# Patient Record
Sex: MALE
[De-identification: names, ages, dates, MRNs, and addresses within clinical notes are randomized per-mention and may not be internally consistent; named-entity substitution may affect disease eponyms.]

---

## 2019-06-05 ENCOUNTER — HOSPITAL ENCOUNTER (EMERGENCY)
Dept: HOSPITAL 89 - ER | Age: 39
Discharge: HOME | End: 2019-06-05
Payer: COMMERCIAL

## 2019-06-05 VITALS — SYSTOLIC BLOOD PRESSURE: 134 MMHG | DIASTOLIC BLOOD PRESSURE: 87 MMHG

## 2019-06-05 DIAGNOSIS — M54.31: Primary | ICD-10-CM

## 2019-06-05 PROCEDURE — 99284 EMERGENCY DEPT VISIT MOD MDM: CPT

## 2019-06-05 PROCEDURE — 72120 X-RAY BEND ONLY L-S SPINE: CPT

## 2019-06-05 PROCEDURE — 72202 X-RAY EXAM SI JOINTS 3/> VWS: CPT

## 2019-06-05 NOTE — RADIOLOGY IMAGING REPORT
FACILITY: [facility] [place] 

 

PATIENT NAME: Saturnino Dill

: 1980

MR: [id number]

V: [id number]

EXAM DATE: 

ORDERING PHYSICIAN: MATTIE BISWAS

TECHNOLOGIST: 

 

Location: [facility]

Patient: Saturnino Dill

: 1980

MRN: [id number]

Visit/Account:[id number]

Date of Sevice:  2019

 

ACCESSION #: [id number]

 

Exam type: SACROILIAC JOINT 3 OR > VIEWS

 

History: 2018, splinting numbness and tingling occasionally

 

Comparison: None.

 

Findings:

 

The SI joints appear symmetric bilaterally.  No significant arthritic change identified within the SI
 joints.  There is no gross evidence of a sacral fracture on provided images.

 

IMPRESSION:

 

1.  Unremarkable SI joints

 

Report Dictated By: Lauren Rosaels MD at 2019 4:22 PM

 

Report E-Signed By: Lauren Rosales MD  at 2019 4:23 PM

 

WSN:[id number]

## 2019-06-05 NOTE — ER REPORT
History and Physical


Time Seen By MD:  15:26


Hx. of Stated Complaint:  


Pt. slipped on ice back in November, no acute injury. Now has right buttock pain





that radiates down his leg into his calf. Pain 8/10. Pt. seen at urgent care on 


Saturday, given Flexeril and Prednisone dose pack and isn't getting any relief.


HPI/ROS


CHIEF COMPLAINT: Right leg pain





HISTORY OF PRESENT ILLNESS: 38 year old male presents to ED with reports of 


right leg pain. Back in November patient slipped on an ice patch and fell on his


back. At that time he had back pain that lasted for several weeks, however it 


eventually went away. 2 weeks ago the same type of back pain returned. Reports 


dull pain in right lower back that sent shooting, sharp, stabbing, and tingling 


sensations to his right hamstring and right calf. Denies numbness in his feet, 


denies saddle anesthesia. Patient reports he has taken 1/2 a tab of old 


hydrocodone a day since the pain started, which helped with the pain, but he 


took his last one this weekend. He went to Urgent care on Saturday where they 


prescribed him a medrol dose pack and cyclobenzaprine. However, his pain has 


worsened since that time. All movement including standing aggravates the pain; 


laying in a side lying position helps the pain. 





REVIEW OF SYSTEMS:


Constitutional: Denies fevers, appetite changes


Respiratory: No cough, no dyspnea.


Cardiovascular: No chest pain, no palpitations.


Gastrointestinal: No vomiting, no abdominal pain.


Musculoskeletal: Back pain as noted above.


Allergies:  


Coded Allergies:  


     No Known Drug Allergies (Unverified , 6/5/19)


Home Meds


Active Scripts


Hydrocodone Bit/Acetaminophen (HYDROCODON-ACETAMINOPHEN 5-325) 1 Each Tablet, 1 


EACH PO Q4-6H PRN for PAIN, #6 TAB


   Prov:MATTIE BISWAS FNCHINYERE         6/5/19


Past Medical/Surgical History


Past medical hx of MICH.


No significant past surgical hx.


Reviewed Nurses Notes:  Yes


Constitutional





Vital Sign - Last 24 Hours








 6/5/19 6/5/19 6/5/19 6/5/19





 15:16 15:18 15:23 15:28


 


Temp 97.8   


 


Pulse 82  91 93


 


Resp 20   


 


B/P (MAP) 151/97 151/97 (115)  


 


Pulse Ox 88  91 93


 


O2 Delivery Room Air   


 


    





 6/5/19 6/5/19 6/5/19 6/5/19





 15:30 15:33 15:38 15:43


 


Pulse  86 81 85


 


B/P (MAP) 127/95 (106)   


 


Pulse Ox  94 90 90





 6/5/19 6/5/19 6/5/19 6/5/19





 15:48 16:14 16:18 16:23


 


Pulse 77  79 82


 


B/P (MAP)  138/95 (109)  


 


Pulse Ox 90  93 91





 6/5/19 6/5/19 6/5/19 





 16:28 16:30 16:33 


 


Pulse 88  84 


 


B/P (MAP)  134/87 (103)  


 


Pulse Ox 89  90 








Physical Exam


General Appearance: The patient is alert, has no immediate need for airway 


protection and no current signs of toxicity.  


Eyes: Pupils equal and round no injection.


Respiratory: Chest is non tender, lungs are clear to auscultation.


Cardiac: regular rate and rhythm 


Gastrointestinal: Abdomen is soft and non tender, no masses, bowel sounds 


normal.


Musculoskeletal:  Neck: Neck is supple and non tender. Able to perform straight 


leg raise test, however, when lifting right leg, reports shooting pains. On 


palpation of right lower back, has mild pain. Sharp pain with palpation of mid 


hamstring. No pain with palpation of calf.


Skin: No rashes or lesions.





DIFFERENTIAL DIAGNOSIS: After history and physical exam differential diagnosis 


was considered for sciatica, disc disease, muscle strain, muscle spasm.





Medical Decision Making


EKG/Imaging


Imaging


Lumbar spine x-ray:


Findings:


 


AP lateral and both oblique views of the lumbar spine were submitted.


 


There are five nonrib-bearing lumbar-type bodies present.  Small marginal 


osteophyte are noted at L3, L4-L5.  There is mild disc space narrowing at L4-5 


and L5-S1.  Period there is a small calcification projecting just posterior to 


the L2-3 level.  There is a minimal levoconvex scoliosis


 


IMPRESSION:


 


1.  Mild spondylotic changes of lumbar spine as described.  If patient's pain co


ntinues MR is recommended





SI joint x-ray:


Findings:


 


The SI joints appear symmetric bilaterally.  No significant arthritic change 


identified within the SI joints.  There is no gross evidence of a sacral 


fracture on provided images.


 


IMPRESSION:


 


1.  Unremarkable SI joints





ED Course/Re-evaluation


ED Course


Upon arrival to the ED, patient admitted to an exam room, hx and physical 


obtained, differentials considered. Patient presents with right low back pain 


that radiates down his right leg. Back in November patient slipped on an ice 


patch and fell on his back. At that time he had back pain that lasted for 


several weeks, however it eventually went away. 2 weeks ago the same type of 


back pain returned. Reports dull pain in right lower back that sends shooting, 


sharp, stabbing, and tingling sensations to his right hamstring and right calf. 


Denies numbness in his feet, denies saddle anesthesia. He went to Urgent care on


Saturday where they prescribed him a medrol dose pack and cyclobenzaprine. 


However, his pain has worsened since that time. Able to perform straight leg 


raise test, however, when lifting right leg, reports shooting pains. On 


palpation of right lower back, has mild pain. Sharp pain with palpation of mid 


hamstring. No pain with palpation of calf. Lumbar spine and SI joint x-rays 


ordered. Lumbar x-ray showed There is mild disc space narrowing at L4-5 and L5-


S1. Unremarkable SI joints. Since no acute concern at this time, will refer 


patient to Dr. Pace for further evaluation. In the meantime will prescribe 


Lrotab to help with the pain and have him continue his medrol dose pack and c


yclobenzaprine he received from Urgent care. Patient agrees with plan of care.


Decision to Disposition Date:  Jun 5, 2019


Decision to Disposition Time:  16:40





Depart


Departure


Latest Vital Signs





Vital Signs








  Date Time  Temp Pulse Resp B/P (MAP) Pulse Ox O2 Delivery O2 Flow Rate FiO2


 


6/5/19 16:33  84   90   


 


6/5/19 16:30    134/87 (103)    


 


6/5/19 15:16 97.8  20   Room Air  








Impression:  


   Primary Impression:  


   Sciatic leg pain


Condition:  Condition Unchanged


Disposition:  HOME OR SELF-CARE


Referrals:  


MELE PACE MD


New Scripts


Hydrocodone Bit/Acetaminophen (HYDROCODON-ACETAMINOPHEN 5-325) 1 Each Tablet


1 EACH PO Q4-6H PRN for PAIN, #6 TAB


   Prov: MATTIE BISWAS FNCHINYERE         6/5/19


Patient Instructions:  Sciatica (ED)





Additional Instructions:  


Please drink plenty of water and get plenty of rest.


You may take 1 tab of lortab every 4-6 hours as needed.


Please follow-up with Dr. Pace as soon as possible for continued pain.


Continue to take the medications prescribed to you by Urgent care.


Please return to the ED with increased pain, numbness in your extremities, or 


any other concerns.











MATTIE BISWAS                 Jun 5, 2019 15:26

## 2019-06-05 NOTE — RADIOLOGY IMAGING REPORT
FACILITY: [facility] - [place] 

 

PATIENT NAME: Saturnino Dill

: 1980

MR: [id number]

V: [id number]

EXAM DATE: 

ORDERING PHYSICIAN: MATTIE BISWAS

TECHNOLOGIST: 

 

Location: [facility]

Patient: Saturnino Dill

: 1980

MRN: [id number]

Visit/Account:[id number]

Date of Sevice:  2019

 

ACCESSION #: [id number]

 

Exam type: L-SPINE >4 VIEWS

 

History: Fell in 2018, experiencing numbness and tingling occasionally

 

Comparison: None.

 

Findings:

 

AP lateral and both oblique views of the lumbar spine were submitted.

 

There are five nonrib-bearing lumbar-type bodies present.  Small marginal osteophyte are noted at L3,
 L4-L5.  There is mild disc space narrowing at L4-5 and L5-S1.  Period there is a small calcification
 projecting just posterior to the L2-3 level.  There is a minimal levoconvex scoliosis

 

IMPRESSION:

 

1.  Mild spondylotic changes of lumbar spine as described.  If patient's pain continues MR is recomme
nded

 

Report Dictated By: Lauren Rosales MD at 2019 4:23 PM

 

Report E-Signed By: Lauren Rosales MD  at 2019 4:25 PM

 

WSN:[id number]

## 2019-07-15 ENCOUNTER — HOSPITAL ENCOUNTER (EMERGENCY)
Dept: HOSPITAL 89 - ER | Age: 39
Discharge: HOME | End: 2019-07-15
Payer: COMMERCIAL

## 2019-07-15 VITALS — DIASTOLIC BLOOD PRESSURE: 97 MMHG | SYSTOLIC BLOOD PRESSURE: 132 MMHG

## 2019-07-15 DIAGNOSIS — L02.214: Primary | ICD-10-CM

## 2019-07-15 PROCEDURE — 99284 EMERGENCY DEPT VISIT MOD MDM: CPT

## 2019-07-15 PROCEDURE — 76705 ECHO EXAM OF ABDOMEN: CPT

## 2019-07-15 NOTE — RADIOLOGY IMAGING REPORT
FACILITY: [facility] 

 

PATIENT NAME: Saturnino Dill

: 1980

MR: [id number]

V: [id number]

EXAM DATE: 

ORDERING PHYSICIAN: DARIANA SPARKS

TECHNOLOGIST: 

 

Location: [facility] [place]

Patient: Saturnino Dill

: 1980

MRN: [id number]

Visit/Account:[id number]

Date of Sevice:  7/15/2019

 

ACCESSION #: [id number]

 

EXAMINATION:  Right groin ultrasound

 

HISTORY:  Palpable lump at the right groin.

 

COMPARISON:  None.

 

FINDINGS:

Ultrasound evaluation was performed along the soft tissues of the right groin in an area of palpable 
concern.

 

Imaging in this region demonstrates a complex hypoechoic fluid collection measuring 2.6 x 2.6 x 1.0 c
m, with some peripheral hyperemia and adjacent soft tissue edema.

 

IMPRESSION:

Complex 2.6 cm fluid collection in the superficial soft tissues at the right groin. Ultrasound appear
ance is somewhat nonspecific but in the proper clinical setting this could be compatible with a local
ized soft tissue abscess.

 

Report Dictated By: Tonio Oscar MD at 7/15/2019 3:03 PM

 

Report E-Signed By: Tonio Oscar MD  at 7/15/2019 3:06 PM

 

WSN:[id number]

## 2019-07-15 NOTE — ER REPORT
History and Physical


Time Seen By MD:  13:50


Hx. of Stated Complaint:  


KNOT IN GROIN CAUSING PAIN AND NUMBNESS


HPI/ROS


CHIEF COMPLAINT: Groin pain





HISTORY OF PRESENT ILLNESS: 44-year-old male comes in with induration and 


swelling around his right scrotal area and right inguinal area patient 


Cystografin last couple of days with some tenderness she's had some numbness and


is able does have history of sciatica he says this feels different patient 


denies any trauma to the areas had multiple abscesses in the past





REVIEW OF SYSTEMS:


Respiratory: No cough, no dyspnea.


Cardiovascular: No chest pain, no palpitations.


Gastrointestinal: No vomiting, no abdominal pain.


Musculoskeletal: No back pain.


Remainder of the 14 system rev:  Yes


Allergies:  


Coded Allergies:  


     No Known Drug Allergies (Unverified , 7/15/19)


Home Meds


Discontinued Scripts


Hydrocodone Bit/Acetaminophen (HYDROCODON-ACETAMINOPHEN 5-325) 1 Each Tablet, 1 


EACH PO Q4-6H PRN for PAIN, #6 TAB


   Prov:MATTIE BISWAS FNP         6/5/19


Reviewed Nurses Notes:  Yes


Old Medical Records Reviewed:  Yes


Constitutional





Vital Sign - Last 24 Hours








 7/15/19





 14:22


 


Temp 99.5


 


Pulse 117


 


Resp 16


 


B/P (MAP) 132/97


 


Pulse Ox 89


 


O2 Delivery Room Air








Physical Exam


   General appearance: Alert no distress.


Respiratory: Chest is non tender, lungs are clear to auscultation.


Cardiac: Regular rate and rhythm [ ]


Scrotal inguinal exam patient does have a 6 x 8 cm area and around the inguinal 


and scrotal region consistent with a probable abscess nonfluctuant somewhat 


mobile clearly indurated


DIFFERENTIAL DIAGNOSIS: After history and physical exam differential diagnosis 


was considered for abscess versus mass





Medical Decision Making


ED Course/Re-evaluation


ED Course


ED course 38-year-old male comes in with an indurated abscess in the right 


inguinal area unable to be drained at this time with procedure talked to general


surgery after the ultrasound he had reviewed decided was put on antibiotics in 


the next 2 days and have him follow-up in his clinic I will he'll hopefully have


enough up to drain at that point


Decision to Disposition Date:  Jul 15, 2019


Decision to Disposition Time:  15:00





Depart


Departure


Latest Vital Signs





Vital Signs








  Date Time  Temp Pulse Resp B/P (MAP) Pulse Ox O2 Delivery O2 Flow Rate FiO2


 


7/15/19 14:22 99.5 117 16 132/97 89 Room Air  








Impression:  


   Primary Impression:  


   Abscess


Condition:  Condition Unchanged


Disposition:  HOME OR SELF-CARE


Referrals:  


ULLRICH,JOHN A MD


2 Days


New Scripts


Amoxicillin/Pot Clav 875-125 Mg Tab (AUGMENTIN 875-125 TABLET) 1 Each Tablet


1 TAB PO Q12H for 7 Days, #14 TAB


   Prov: DARIANA SPARKS MD         7/15/19


Patient Instructions:  Abscess (ED)





Additional Instructions:  


Apply warm compresses frequently











DARIANA SPARKS MD           Jul 15, 2019 14:56

## 2019-07-17 ENCOUNTER — HOSPITAL ENCOUNTER (OUTPATIENT)
Dept: HOSPITAL 89 - LAB | Age: 39
End: 2019-07-17
Attending: SURGERY
Payer: COMMERCIAL

## 2019-07-17 DIAGNOSIS — B95.1: ICD-10-CM

## 2019-07-17 DIAGNOSIS — B96.89: ICD-10-CM

## 2019-07-17 DIAGNOSIS — N49.2: Primary | ICD-10-CM

## 2019-07-17 PROCEDURE — 87077 CULTURE AEROBIC IDENTIFY: CPT

## 2019-07-17 PROCEDURE — 87073 CULTURE BACTERIA ANAEROBIC: CPT

## 2019-07-17 PROCEDURE — 87070 CULTURE OTHR SPECIMN AEROBIC: CPT

## 2019-07-17 PROCEDURE — 87186 SC STD MICRODIL/AGAR DIL: CPT

## 2019-07-19 ENCOUNTER — HOSPITAL ENCOUNTER (INPATIENT)
Dept: HOSPITAL 89 - ER | Age: 39
LOS: 2 days | Discharge: ANOTHER HOSPITAL | DRG: 853 | End: 2019-07-21
Attending: INTERNAL MEDICINE | Admitting: INTERNAL MEDICINE
Payer: COMMERCIAL

## 2019-07-19 VITALS — SYSTOLIC BLOOD PRESSURE: 122 MMHG | DIASTOLIC BLOOD PRESSURE: 67 MMHG

## 2019-07-19 VITALS — SYSTOLIC BLOOD PRESSURE: 106 MMHG | DIASTOLIC BLOOD PRESSURE: 66 MMHG

## 2019-07-19 VITALS — SYSTOLIC BLOOD PRESSURE: 124 MMHG | DIASTOLIC BLOOD PRESSURE: 69 MMHG

## 2019-07-19 VITALS — DIASTOLIC BLOOD PRESSURE: 67 MMHG | SYSTOLIC BLOOD PRESSURE: 107 MMHG

## 2019-07-19 VITALS — DIASTOLIC BLOOD PRESSURE: 70 MMHG | SYSTOLIC BLOOD PRESSURE: 123 MMHG

## 2019-07-19 VITALS — HEIGHT: 69 IN | WEIGHT: 315 LBS | BODY MASS INDEX: 46.65 KG/M2

## 2019-07-19 VITALS — SYSTOLIC BLOOD PRESSURE: 116 MMHG | DIASTOLIC BLOOD PRESSURE: 67 MMHG

## 2019-07-19 VITALS — SYSTOLIC BLOOD PRESSURE: 114 MMHG | DIASTOLIC BLOOD PRESSURE: 72 MMHG

## 2019-07-19 VITALS — DIASTOLIC BLOOD PRESSURE: 73 MMHG | SYSTOLIC BLOOD PRESSURE: 117 MMHG

## 2019-07-19 VITALS — SYSTOLIC BLOOD PRESSURE: 113 MMHG | DIASTOLIC BLOOD PRESSURE: 63 MMHG

## 2019-07-19 VITALS — SYSTOLIC BLOOD PRESSURE: 116 MMHG | DIASTOLIC BLOOD PRESSURE: 73 MMHG

## 2019-07-19 VITALS — SYSTOLIC BLOOD PRESSURE: 102 MMHG | DIASTOLIC BLOOD PRESSURE: 60 MMHG

## 2019-07-19 VITALS — SYSTOLIC BLOOD PRESSURE: 116 MMHG | DIASTOLIC BLOOD PRESSURE: 69 MMHG

## 2019-07-19 VITALS — DIASTOLIC BLOOD PRESSURE: 64 MMHG | SYSTOLIC BLOOD PRESSURE: 117 MMHG

## 2019-07-19 VITALS — DIASTOLIC BLOOD PRESSURE: 69 MMHG | SYSTOLIC BLOOD PRESSURE: 115 MMHG

## 2019-07-19 VITALS — DIASTOLIC BLOOD PRESSURE: 68 MMHG | SYSTOLIC BLOOD PRESSURE: 117 MMHG

## 2019-07-19 VITALS — SYSTOLIC BLOOD PRESSURE: 109 MMHG | DIASTOLIC BLOOD PRESSURE: 61 MMHG

## 2019-07-19 VITALS — SYSTOLIC BLOOD PRESSURE: 108 MMHG | DIASTOLIC BLOOD PRESSURE: 60 MMHG

## 2019-07-19 VITALS — SYSTOLIC BLOOD PRESSURE: 119 MMHG | DIASTOLIC BLOOD PRESSURE: 76 MMHG

## 2019-07-19 DIAGNOSIS — A41.9: Primary | ICD-10-CM

## 2019-07-19 DIAGNOSIS — G47.33: ICD-10-CM

## 2019-07-19 DIAGNOSIS — K85.90: ICD-10-CM

## 2019-07-19 DIAGNOSIS — E11.10: ICD-10-CM

## 2019-07-19 DIAGNOSIS — L02.818: ICD-10-CM

## 2019-07-19 DIAGNOSIS — Z87.891: ICD-10-CM

## 2019-07-19 DIAGNOSIS — N49.2: ICD-10-CM

## 2019-07-19 DIAGNOSIS — B96.89: ICD-10-CM

## 2019-07-19 DIAGNOSIS — E66.01: ICD-10-CM

## 2019-07-19 DIAGNOSIS — B37.49: ICD-10-CM

## 2019-07-19 DIAGNOSIS — M54.30: ICD-10-CM

## 2019-07-19 DIAGNOSIS — N43.1: ICD-10-CM

## 2019-07-19 DIAGNOSIS — M72.6: ICD-10-CM

## 2019-07-19 LAB — PLATELET COUNT, AUTOMATED: 192 K/UL (ref 150–450)

## 2019-07-19 PROCEDURE — 87088 URINE BACTERIA CULTURE: CPT

## 2019-07-19 PROCEDURE — 76705 ECHO EXAM OF ABDOMEN: CPT

## 2019-07-19 PROCEDURE — 82803 BLOOD GASES ANY COMBINATION: CPT

## 2019-07-19 PROCEDURE — 71260 CT THORAX DX C+: CPT

## 2019-07-19 PROCEDURE — 76870 US EXAM SCROTUM: CPT

## 2019-07-19 PROCEDURE — 84155 ASSAY OF PROTEIN SERUM: CPT

## 2019-07-19 PROCEDURE — 87040 BLOOD CULTURE FOR BACTERIA: CPT

## 2019-07-19 PROCEDURE — 84681 ASSAY OF C-PEPTIDE: CPT

## 2019-07-19 PROCEDURE — 87077 CULTURE AEROBIC IDENTIFY: CPT

## 2019-07-19 PROCEDURE — 83735 ASSAY OF MAGNESIUM: CPT

## 2019-07-19 PROCEDURE — 84450 TRANSFERASE (AST) (SGOT): CPT

## 2019-07-19 PROCEDURE — 87070 CULTURE OTHR SPECIMN AEROBIC: CPT

## 2019-07-19 PROCEDURE — 36416 COLLJ CAPILLARY BLOOD SPEC: CPT

## 2019-07-19 PROCEDURE — 84295 ASSAY OF SERUM SODIUM: CPT

## 2019-07-19 PROCEDURE — 96367 TX/PROPH/DG ADDL SEQ IV INF: CPT

## 2019-07-19 PROCEDURE — 84075 ASSAY ALKALINE PHOSPHATASE: CPT

## 2019-07-19 PROCEDURE — 83036 HEMOGLOBIN GLYCOSYLATED A1C: CPT

## 2019-07-19 PROCEDURE — 87073 CULTURE BACTERIA ANAEROBIC: CPT

## 2019-07-19 PROCEDURE — 82947 ASSAY GLUCOSE BLOOD QUANT: CPT

## 2019-07-19 PROCEDURE — 84132 ASSAY OF SERUM POTASSIUM: CPT

## 2019-07-19 PROCEDURE — 96361 HYDRATE IV INFUSION ADD-ON: CPT

## 2019-07-19 PROCEDURE — 96375 TX/PRO/DX INJ NEW DRUG ADDON: CPT

## 2019-07-19 PROCEDURE — 0V95XZZ DRAINAGE OF SCROTUM, EXTERNAL APPROACH: ICD-10-PCS | Performed by: UROLOGY

## 2019-07-19 PROCEDURE — 36415 COLL VENOUS BLD VENIPUNCTURE: CPT

## 2019-07-19 PROCEDURE — 81001 URINALYSIS AUTO W/SCOPE: CPT

## 2019-07-19 PROCEDURE — 85025 COMPLETE CBC W/AUTO DIFF WBC: CPT

## 2019-07-19 PROCEDURE — 82247 BILIRUBIN TOTAL: CPT

## 2019-07-19 PROCEDURE — 87205 SMEAR GRAM STAIN: CPT

## 2019-07-19 PROCEDURE — 82310 ASSAY OF CALCIUM: CPT

## 2019-07-19 PROCEDURE — 99285 EMERGENCY DEPT VISIT HI MDM: CPT

## 2019-07-19 PROCEDURE — 84520 ASSAY OF UREA NITROGEN: CPT

## 2019-07-19 PROCEDURE — 82435 ASSAY OF BLOOD CHLORIDE: CPT

## 2019-07-19 PROCEDURE — 82150 ASSAY OF AMYLASE: CPT

## 2019-07-19 PROCEDURE — 83690 ASSAY OF LIPASE: CPT

## 2019-07-19 PROCEDURE — 82040 ASSAY OF SERUM ALBUMIN: CPT

## 2019-07-19 PROCEDURE — 84460 ALANINE AMINO (ALT) (SGPT): CPT

## 2019-07-19 PROCEDURE — 71045 X-RAY EXAM CHEST 1 VIEW: CPT

## 2019-07-19 PROCEDURE — 96376 TX/PRO/DX INJ SAME DRUG ADON: CPT

## 2019-07-19 PROCEDURE — 82565 ASSAY OF CREATININE: CPT

## 2019-07-19 PROCEDURE — 82374 ASSAY BLOOD CARBON DIOXIDE: CPT

## 2019-07-19 PROCEDURE — 87186 SC STD MICRODIL/AGAR DIL: CPT

## 2019-07-19 PROCEDURE — 83605 ASSAY OF LACTIC ACID: CPT

## 2019-07-19 PROCEDURE — 96365 THER/PROPH/DIAG IV INF INIT: CPT

## 2019-07-19 PROCEDURE — 74177 CT ABD & PELVIS W/CONTRAST: CPT

## 2019-07-19 PROCEDURE — 82948 REAGENT STRIP/BLOOD GLUCOSE: CPT

## 2019-07-19 RX ADMIN — SODIUM CHLORIDE AND POTASSIUM CHLORIDE SCH MLS/HR: 9; 1.49 INJECTION, SOLUTION INTRAVENOUS at 22:15

## 2019-07-19 RX ADMIN — KETOROLAC TROMETHAMINE PRN MG: 30 INJECTION, SOLUTION INTRAMUSCULAR; INTRAVENOUS at 22:03

## 2019-07-19 RX ADMIN — SODIUM CHLORIDE AND POTASSIUM CHLORIDE SCH MLS/HR: 9; 1.49 INJECTION, SOLUTION INTRAVENOUS at 18:12

## 2019-07-19 NOTE — RADIOLOGY IMAGING REPORT
FACILITY: [facility] [place] 

 

PATIENT NAME: Saturnino Dill

: 1980

MR: [id number]

V: [id number]

EXAM DATE: 

ORDERING PHYSICIAN: YRIS ROMAN

TECHNOLOGIST: 

 

Location: [facility] [place]

Patient: Saturnino Dill

: 1980

MRN: [id number]

Visit/Account:[id number]

Date of Sevice:  2019

 

ACCESSION #: [id number]

 

Scrotal and groin ultrasound

 

INDICATION: Draining abscess

 

COMPARISON: Groin ultrasound 7/15/2019 and CT chest abdomen pelvis 2019

 

FINDINGS:

Right testicle measures 4.3 x 1.8 x 2.9 cm in cc, AP, and transverse dimensions respectively.

Increased blood flow is noted within the right testis and the epididymis

There is a moderate complex fluid collection present

No varicocele identified.

The right epididymal head measures 0.9cm. And the body is diffusely enlarged and hyperemic

 

Left testicle measures 3.1 x 1.9 x 3.2 cm in cc, AP, and transverse dimensions respectively.

There is diffusely increased blood flow noted

There is a large very complex hydrocele present.

No varicocele identified.

The left epididymal head measures 0.9 cm. The epididymis is diffusely enlarged and increased in vascu
larity

 

The bilateral testicles appear homogenous in echogenicity.

 

Groin ultrasound demonstrates no significant fluid collection.  Previously described small fluid tracey
ection within the right groin appears to have resolved.

 

IMPRESSION:

1.  There are complex bilateral fluid hydroceles left greater than right.  Bilaterally, there is incr
eased blood flow noted within the testes and heterogeneously enlarged and vascular epididymides.  Fin
dings are concerning for bilateral epididymal orchitis with bilateral hydroceles.  When compared to r
ecent CT scan, diffuse inflammation and complex fluid collections are also noted on CT scan.  Recomme
nd urology consultation.

2.  Interval resolution of complex fluid within the right groin.  No fluid collection is noted within
 the left groin.

 

Results were discussed with YRIS ROMAN at 2019 3:13 PM.

 

Report Dictated By: Arslan Hooks at 2019 2:52 PM

 

Report E-Signed By: Arslan Hooks  at 2019 3:13 PM

 

WSN:[id number]

## 2019-07-19 NOTE — ER REPORT
History and Physical


Time Seen By MD:  12:25


HPI/ROS


CHIEF COMPLAINT: Fever, tachycardic, decreased appetite, altered mental status





HISTORY OF PRESENT ILLNESS: Patient is a 38-year-old male here with complaints 


of nausea, decreased appetite, altered mental status, fevers, dehydration in the


setting of a recent groin abscess drainage on July 15 with Dr. Ladd. Patient 


reportedly has had increasing pain today, decreased appetite, nausea and reports


of altered mental status per patient's fiance. Patient is afebrile, 


tachycardic, normotensive at time of arrival.





REVIEW OF SYSTEMS:


Constitutional: + fever, + chills.


Eyes: No discharge.


ENT: No sore throat. 


Cardiovascular: No chest pain, no palpitations.


Respiratory: No cough, no shortness of breath.


Gastrointestinal: + abdominal pain, no vomiting.+ Nausea


Genitourinary: No hematuria. Testicular pain, recent history of testicular 


abscess


Musculoskeletal: No back pain.


Skin: Erythema of the groin and right side of the scrotum


Neurological: No headache. + Somnolent, altered mental status


Allergies:  


Coded Allergies:  


     No Known Drug Allergies (Unverified , 7/15/19)


Home Meds


Active Scripts


Tramadol Hcl (TRAMADOL HCL) 50 Mg Tablet, 1-2 TAB PO Q4H PRN for PAIN, #20 TAB 0


Refills


   Prov:ULLRICH,JOHN A MD         19


Amoxicillin/Pot Clav 875-125 Mg Tab (AUGMENTIN 875-125 TABLET) 1 Each Tablet, 1 


TAB PO Q12H for 7 Days, #14 TAB


   Prov:DARIANA SPARKS MD         7/15/19


Discontinued Scripts


Hydrocodone Bit/Acetaminophen (HYDROCODON-ACETAMINOPHEN 5-325) 1 Each Tablet, 1 


EACH PO Q4-6H PRN for PAIN, #6 TAB


   Prov:MATTIE BISWAS FNP         19


Constitutional





Vital Sign - Last 24 Hours








 19





 12:37 13:03


 


Temp 99.6 


 


Pulse 129 


 


Resp 16 


 


B/P (MAP) 126/78 


 


Pulse Ox 87 


 


O2 Delivery Room Air 


 


O2 Flow Rate  2.0








Physical Exam


   General Appearance: The patient is alert, has no immediate need for airway 


protection and no signs of toxicity. Uncomfortable appearing


Eyes: Pupils equal and round no pallor or injection.


ENT, Mouth: Mucous membranes are moist.


Respiratory: There are no retractions, lungs are clear to auscultation.


Cardiovascular: Sinus tachycardia


Gastrointestinal:  Abdomen is soft and tender in the lower abdominal 


distribution into the right groin and testicle


Neurological: Somnolent, slow to respond, moving all extremities spontaneously, 


cranial nerves intact


Skin: Erythema and tenderness in the right groin and scrotum


Musculoskeletal:  


      Extremities are nontender, nonswollen and have full range of motion.





DIFFERENTIAL DIAGNOSIS: After history and physical exam differential diagnosis w


as considered for adult fever including but not limited to viral syndromes 


including influenza, urinary tract infection, pneumonia and sepsis.





Medical Decision Making


Data Points


Result Diagram:  


19 1254                                                                    


           19 1254





Laboratory





Hematology








Test


 19


12:54


 


White Blood Count


 9.0 k/uL


(4.5-11.0)


 


Red Blood Count


 4.83 M/uL


(4.00-5.60)


 


Hemoglobin


 14.7 g/dL


(14.0-18.0)


 


Hematocrit


 42.6 %


(42.0-52.0)


 


Mean Corpuscular Volume


 88.3 fL


(80.0-96.0)


 


Mean Corpuscular Hemoglobin


 30.5 pg


(26.0-33.0)


 


Mean Corpuscular Hemoglobin


Concent 34.6 g/dL


(32.0-36.0)


 


Red Cell Distribution Width


 13.5 %


(11.5-14.5)


 


Platelet Count


 192 K/uL


(150-450)


 


Mean Platelet Volume


 9.7 fL


(7.2-11.1)


 


Neutrophils (%) (Auto)


 85.4 %


(39.4-72.5)  H


 


Lymphocytes (%) (Auto)


 8.8 %


(17.6-49.6)  L


 


Monocytes (%) (Auto)


 5.0 %


(4.1-12.4)


 


Eosinophils (%) (Auto)


 0.1 %


(0.4-6.7)  L


 


Basophils (%) (Auto)


 0.7 %


(0.3-1.4)


 


Nucleated RBC Relative Count


(auto) 0.2 /100WBC  





 


Neutrophils # (Auto)


 7.7 K/uL


(2.0-7.4)  H


 


Lymphocytes # (Auto)


 0.8 K/uL


(1.3-3.6)  L


 


Monocytes # (Auto)


 0.4 K/uL


(0.3-1.0)


 


Eosinophils # (Auto)


 0.0 K/uL


(0.0-0.5)


 


Basophils # (Auto)


 0.1 K/uL


(0.0-0.1)


 


Nucleated RBC Absolute Count


(auto) 0.01 K/uL  











Chemistry








Test


 19


12:54


 


Sodium Level


 127 mmol/L


(137-145)


 


Potassium Level


 3.9 mmol/L


(3.5-5.0)


 


Chloride Level


 92 mmol/L


()


 


Carbon Dioxide Level


 16 mmol/L


(22-30)


 


Blood Urea Nitrogen


 23 mg/dl


(9-21)


 


Creatinine


 0.80 mg/dl


(0.66-1.25)


 


Glomerular Filtration Rate


Calc > 60.0 





 


Random Glucose


 577 mg/dl


()


 


Lactate


 2.9 mmol/L


(0.7-2.1)


 


Calcium Level


 9.1 mg/dl


(8.4-10.2)


 


Total Bilirubin


 0.9 mg/dl


(0.2-1.3)


 


Aspartate Amino Transf


(AST/SGOT) 27 U/L (0-35) 





 


Alanine Aminotransferase


(ALT/SGPT) 25 U/L (0-56) 





 


Alkaline Phosphatase


 132 U/L


(0-126)


 


Total Protein


 7.1 g/dl


(6.3-8.2)


 


Albumin


 3.3 g/dl


(3.5-5.0)


 


Lipase


 515 U/L


()








Urinalysis








Test


 19


12:54


 


Urine Color Yellow 


 


Urine Clarity


 Slightly-cloudy





 


Urine pH


 6.0 pH


(4.8-9.5)


 


Urine Specific Gravity 1.028 


 


Urine Protein


 30 mg/dL


(NEGATIVE)


 


Urine Glucose (UA)


 500 mg/dL


(NEGATIVE)


 


Urine Ketones


 20 mg/dL


(NEGATIVE)


 


Urine Blood


 Moderate


(NEGATIVE)


 


Urine Nitrite


 Negative


(NEGATIVE)


 


Urine Bilirubin


 Negative


(NEGATIVE)


 


Urine Urobilinogen


 2.0 mg/dL


(0.2-1.9)


 


Urine Leukocyte Esterase


 Large


(NEGATIVE)


 


Urine RBC


 15 /HPF


(0-2/HPF)


 


Urine WBC


 30 /HPF


(0-5/HPF)


 


Urine Squamous Epithelial


Cells Many /LPF


(</=FEW)


 


Urine Bacteria


 Few /HPF


(NONE-FEW)


 


Urine Mucus


 Few /HPF


(NONE-FEW)











EKG/Imaging


Imaging


PATIENT NAME: Saturnino Dill


: 1980


MR: [id number]


V: [id number]


EXAM DATE: 


ORDERING PHYSICIAN: YRIS ROMAN


TECHNOLOGIST: 


 


Location: [facility] [place]


Patient: Saturnino Dill


: 1980


MRN: [id number]


Visit/Account:[id number]


Date of Sevice:  2019


 


ACCESSION #: [id number]


 


Scrotal and groin ultrasound


 


INDICATION: Draining abscess


 


COMPARISON: Groin ultrasound 7/15/2019 and CT chest abdomen pelvis 2019


 


FINDINGS:


Right testicle measures 4.3 x 1.8 x 2.9 cm in cc, AP, and transverse dimensions 


respectively.


Increased blood flow is noted within the right testis and the epididymis


There is a moderate complex fluid collection present


No varicocele identified.


The right epididymal head measures 0.9cm. And the body is diffusely enlarged and


hyperemic


 


Left testicle measures 3.1 x 1.9 x 3.2 cm in cc, AP, and transverse dimensions 


respectively.


There is diffusely increased blood flow noted


There is a large very complex hydrocele present.


No varicocele identified.


The left epididymal head measures 0.9 cm. The epididymis is diffusely enlarged 


and increased in vascularity


 


The bilateral testicles appear homogenous in echogenicity.


 


Groin ultrasound demonstrates no significant fluid collection.  Previously 


described small fluid collection within the right groin appears to have 


resolved.


 


IMPRESSION:


1.  There are complex bilateral fluid hydroceles left greater than right.  


Bilaterally, there is increased blood flow noted within the testes and 


heterogeneously enlarged and vascular epididymides.  Findings are concerning for


bilateral epididymal orchitis with bilateral hydroceles.  When compared to 


recent CT scan, diffuse inflammation and complex fluid collections are also 


noted on CT scan.  Recommend urology consultation.


2.  Interval resolution of complex fluid within the right groin.  No fluid 


collection is noted within the left groin.


 


Results were discussed with YRIS ROMAN at 2019 3:13 PM.


 


Report Dictated By: Arslan Hooks at 2019 2:52 PM


 


Report E-Signed By: Arslan Hooks  at 2019 3:13 PM


 PATIENT NAME: Saturnino Dill


: 1980


MR: [id number]


V: [id number]


EXAM DATE: 


ORDERING PHYSICIAN: YRIS ROMAN


TECHNOLOGIST: 


 


Location: [facility] [place]


Patient: Saturnino Dill


: 1980


MRN: [id number]


Visit/Account:[id number]


Date of Sevice:  2019


 


ACCESSION #: [id number]


 


Scrotal and groin ultrasound


 


INDICATION: Draining abscess


 


COMPARISON: Groin ultrasound 7/15/2019 and CT chest abdomen pelvis 2019


 


FINDINGS:


Right testicle measures 4.3 x 1.8 x 2.9 cm in cc, AP, and transverse dimensions 


respectively.


Increased blood flow is noted within the right testis and the epididymis


There is a moderate complex fluid collection present


No varicocele identified.


The right epididymal head measures 0.9cm. And the body is diffusely enlarged and


hyperemic


 


Left testicle measures 3.1 x 1.9 x 3.2 cm in cc, AP, and transverse dimensions 


respectively.


There is diffusely increased blood flow noted


There is a large very complex hydrocele present.


No varicocele identified.


The left epididymal head measures 0.9 cm. The epididymis is diffusely enlarged 


and increased in vascularity


 


The bilateral testicles appear homogenous in echogenicity.


 


Groin ultrasound demonstrates no significant fluid collection.  Previously 


described small fluid collection within the right groin appears to have reso


lved.


 


IMPRESSION:


1.  There are complex bilateral fluid hydroceles left greater than right.  Bilat


erally, there is increased blood flow noted within the testes and 


heterogeneously enlarged and vascular epididymides.  Findings are concerning for


bilateral epididymal orchitis with bilateral hydroceles.  When compared to 


recent CT scan, diffuse inflammation and complex fluid collections are also no


joão on CT scan.  Recommend urology consultation.


2.  Interval resolution of complex fluid within the right groin.  No fluid 


collection is noted within the left groin.


 


Results were discussed with YRIS ROMAN at 2019 3:13 PM.


 PATIENT NAME: Saturnino Dill


: 1980


MR: [id number]


V: [id number]


EXAM DATE: 


ORDERING PHYSICIAN: YRIS ROMAN


TECHNOLOGIST: 


 


Location: [facility] [place]


Patient: Saturnino Dill


: 1980


MRN: [id number]


Visit/Account:[id number]


Date of Sevice:  2019


 


ACCESSION #: [id number]


 


EXAMINATION: CT chest, abdomen, and pelvis with IV contrast


 


HISTORY: Sepsis. History of scrotal abscess, abdominal pain, AMS


 


TECHNIQUE:   Axial CT images of the chest, abdomen, and pelvis were obtained 


with IV contrast, with coronal and sagittal 2D reconstructed images.


One of the following dose optimization techniques was utilized in the pe


rformance of this exam: Automated exposure control; adjustment of the mA and/or 


kV according to the patient's size; or use of an iterative  reconstruction 


technique.  Specific details can be referenced in the facility's radiology CT 


exam operational policy.


Contrast:  75 mL of IV Isovue-370.


 


COMPARISON:   Right groin ultrasound 7/15/2019.


 


FINDINGS:


 


Chest:


Lungs and pleura:  The lungs are clear. No focal consolidation. No pleural 


effusion or pneumothorax. The central airways are patent.


 


Mediastinum and raymond:  Negative.


 


Heart, aorta, and great vessels:  Normal caliber thoracic aorta. Normal heart 


size. No pericardial effusion.


 


Chest lymph node assessment:  Negative.


 


Bones:  Negative.


 


Chest wall:  Negative.


 


Lower neck:  Negative.


 


 


Abdomen/pelvis:


Liver:  Fatty infiltration of the liver.


 


Gallbladder and bile ducts:  Negative.


 


Spleen:  Negative.


 


Pancreas:  Negative.


 


Adrenal glands:  Negative.


 


Kidneys:  Normal size and morphology of both kidneys. The kidneys enhance 


normally. No urinary calculi or hydronephrosis.


 


Bowel and peritoneum:  The small bowel and colon are normal in caliber, without 


evidence of obstruction or any focal inflammatory process. No localized bowel 


wall thickening. Normal appendix. No free fluid or free intraperitoneal air.


 


Pelvic  structures:  The urinary bladder is decompressed with a Jennings catheter


in place. There is diffuse soft tissue edema along the scrotum and perineum. No 


soft tissue gas. There is a complex peripherally enhancing fluid collection in 


the left hemiscrotum surrounding the left testicle which may represent a complex


hydrocele, pyocele, or abscess. This measures approximately 8.0 x 4.3 x 6.8 cm. 


No scrotal gas. There is a smaller right-sided likely complex hydrocele or 


pyocele partially surrounding the right testicle. Scrotal findings could be 


further evaluated with ultrasound. No discrete CT evidence of a soft tissue 


abscess at either groin. There is some nonloculated fluid and edema tracking garcia


periorly along the right groin.


 


Lymph node assessment:  Negative.


 


Vessels:  Negative.


 


Musculoskeletal:   Scattered degenerative changes along the lumbar spine. No 


acute osseous findings.


 


Body wall: Abdominal wall structures appear intact. There is some nonloculated 


fluid tracking along the right groin.


 


IMPRESSION:


1. There is diffuse scrotal edema with a moderate sized peripherally enhancing 


fluid collection in the left hemiscrotum surrounding the left testicle which may


represent complex hydrocele, pyocele, or abscess. Smaller right-sided 


peritesticular fluid collection.


2. Soft tissue edema and stranding tracks posteriorly along the perineum and 


superiorly along the right groin. No scrotal or perineal gas.


3. No other acute findings in the chest, abdomen, or pelvis.


4. Hepatic steatosis.


 


 


Report Dictated By: Tonio Oscar MD at 2019 2:44 PM





ED Course/Re-evaluation


ED Course


Patient is a 38-year-old male here with complaints of subjective fevers, chills,


testicular pain at the site of a prior abscess drainage, nausea, somnolence. Se


psis workup was initiated, blood cultures were collected, IV fluid resuscitation


was initiated and patient was administered ertapenem for antimicrobial coverage.


Patient reportedly has been taking Augmentin without improvement. Patient was 


given 3 subsequent liters of normal saline boluses with improvement of 


hemodynamic's. CT imaging of the chest and pelvis were completed as well as 


ultrasound of the groin testes identifying complex hydroceles bilaterally. I 


discussed the patient with Dr. Floyd with urology and with Dr. Sahu with 


the hospitalist service. Patient was admitted for further treatment and care.


Decision to Disposition Date:  2019


Decision to Disposition Time:  16:04





Depart


Departure


Latest Vital Signs





Vital Signs








  Date Time  Temp Pulse Resp B/P (MAP) Pulse Ox O2 Delivery O2 Flow Rate FiO2


 


19 13:03       2.0 


 


19 12:37 99.6 129 16 126/78 87 Room Air  








Impression:  


   Primary Impression:  


   Testicular abscess


   Additional Impression:  


   Sepsis


Condition:  Improved


Disposition:  Admitted from ER


Referrals:  


ULLRICH,JOHN A MD (PCP)





Problem Qualifiers











YRIS ROMAN DO           2019 12:28

## 2019-07-19 NOTE — RADIOLOGY IMAGING REPORT
FACILITY: [facility] [place] 

 

PATIENT NAME: Saturnino Dill

: 1980

MR: [id number]

V: [id number]

EXAM DATE: 

ORDERING PHYSICIAN: YRIS ROMAN

TECHNOLOGIST: 

 

[place] [facility] [place]

Patient: Saturnino Dill

: 1980

MRN: [id number]

Visit/Account:[id number]

Date of Sevice:  2019

 

ACCESSION #: [id number]

 

Scrotal and groin ultrasound

 

INDICATION: Draining abscess

 

COMPARISON: Groin ultrasound 7/15/2019 and CT chest abdomen pelvis 2019

 

FINDINGS:

Right testicle measures 4.3 x 1.8 x 2.9 cm in cc, AP, and transverse dimensions respectively.

Increased blood flow is noted within the right testis and the epididymis

There is a moderate complex fluid collection present

No varicocele identified.

The right epididymal head measures 0.9cm. And the body is diffusely enlarged and hyperemic

 

Left testicle measures 3.1 x 1.9 x 3.2 cm in cc, AP, and transverse dimensions respectively.

There is diffusely increased blood flow noted

There is a large very complex hydrocele present.

No varicocele identified.

The left epididymal head measures 0.9 cm. The epididymis is diffusely enlarged and increased in vascu
larity

 

The bilateral testicles appear homogenous in echogenicity.

 

Groin ultrasound demonstrates no significant fluid collection.  Previously described small fluid tracey
ection within the right groin appears to have resolved.

 

IMPRESSION:

1.  There are complex bilateral fluid hydroceles left greater than right.  Bilaterally, there is incr
eased blood flow noted within the testes and heterogeneously enlarged and vascular epididymides.  Fin
dings are concerning for bilateral epididymal orchitis with bilateral hydroceles.  When compared to r
ecent CT scan, diffuse inflammation and complex fluid collections are also noted on CT scan.  Recomme
nd urology consultation.

2.  Interval resolution of complex fluid within the right groin.  No fluid collection is noted within
 the left groin.

 

Results were discussed with YRIS ROMAN at 2019 3:13 PM.

 

Report Dictated By: Arslan Hooks at 2019 2:52 PM

 

Report E-Signed By: Arslan Hooks  at 2019 3:13 PM

 

WSN:[id number]

## 2019-07-19 NOTE — OPERATIVE REPORT 1
EVENT DATE:  July 19, 2019

SURGEON:  Evert Floyd MD

ANESTHESIOLOGIST:  None.

ANESTHESIA:  Local anesthetic at skin with 2% lidocaine.    





PREOPERATIVE DIAGNOSIS  

Bilateral scrotal abscesses/infected hydroceles.



POSTOPERATIVE DIAGNOSIS 

Bilateral scrotal abscesses/infected hydroceles.



PROCEDURE PERFORMED 

Bilateral scrotal incision and drainage with irrigation of wound.



PATHOLOGY

Gram stain and culture from left hemiscrotum I and D.  



DRAINS

Both incisions packed with 1/4-inch Nu Gauze.  



COMPLICATIONS 

None.



CONDITION

Patient stable at the conclusion of procedure. 



STATEMENT OF MEDICAL NECESSITY

Patient is a 38-year-old male who presented to the Emergency Room with scrotal 

pain and malaise.  He was found to have bilateral complex hydroceles consistent 

with infection by CT and sonogram.  Of note, he had an I and D approximately 

three days prior by Dr. Jack Ullrich of General Surgery.  Cultures were 

obtained, which revealed rare yeast and lactobacillus in 1+ growth.  The patient

was started on Augmentin and had a drain placed on the right side.  This drain 

was removed yesterday, and now he is presenting with the above findings.  These 

findings were discussed with the patient and his significant other, and he has 

elected to undergo bedside drainage and I and D.  He understands that if this is

an extensive process, we may need to proceed to the operating room for formal I 

and D, complete irrigation, possible debridement of tissue including up to 

scrotum, testis, and surrounding structures.  



DESCRIPTION OF PROCEDURE PERFORMED

The procedure was performed in the Emergency Room on the patient's bed.  He was 

in the supine position.  He was first prepped and draped and sterilely.  The 

left naomi-scrotum was infiltrated with 2% lidocaine.  Following this, a 14-

[place] Angiocath was introduced, and an attempt was done to drain any purulent 

material.  Only approximately 2 to 3 mL of purulent-looking material was 

drained.  This was sent for culture and Gram stain.  Following this, incision 

and drainage was performed using an 11 blade scalpel.  Approximately 1 cm 

incision was made.  The incision was explored with a sterile Q-tip.  He appeared

to have several loculated areas of purulent material, which were broken up with 

the Q-tip.  The fluid appeared to be light brown in color, but had no malodorous

odor.  The testis itself palpated fairly normal, although was tender.  Following

this, the wound was irrigated with approximately 100 mL of normal saline, and 

this was drained out as well.  After this was performed, the wound was packed 

with 1/4-inch Nu Gauze.  At this time, attention was directed toward the right 

side.  The previous incision had a small amount of eschar just inside the 

opening.  A Q-tip applicator was used to explore this incision.  It was 

significantly smaller than the left side, and only a small amount of purulent 

material was expressed.  This wound was also irrigated with 100 mL of normal 

saline, and this was also packed with 1/4-inch Nu Gauze.  A scrotal fluff 

dressing was placed on the scrotum with mesh panties.  The patient was stable at

the conclusion of the procedure.  



PLAN

The plan is to have the patient be admitted to the hospitalist service for 

control of his diabetes and give him broad-spectrum antibiotics.  Will perform 

bedside dressing change tomorrow.  If it appears that he is not responding, or 

there is continued concern for undrained infection, will need to proceed to the 

operating room for formal exploration.  

KRISTI

## 2019-07-19 NOTE — CONSULTATION
EVENT DATE:  July 19, 2019 



REASON FOR CONSULTATION 

Bilateral scrotal infection.  



HISTORY OF PRESENT ILLNESS 

Patient is a 38-year-old obese male who presented to the Emergency Room with 

bilateral scrotal swelling and general malaise.  Of note, the patient has 

recently seen Dr. Jack Ullrich of General Surgery and had a right scrotal 

abscess drained.  The packing of this abscess was removed several hours before 

admission.  Upon admission, he was evaluated by the emergency room physician, 

who obtained laboratory data which revealed a significantly elevated blood 

glucose of greater than 500.  His white count was not elevated; however, he did 

have a left shift of 85% neutrophils.  His lactate was mildly elevated at 2.9.  

His electrolytes revealed a sodium of 127 and a CO2 of 16.  His LFTs were 

normal.  His lipase was elevated at 515.  The patient was evaluated with both a 

scrotal ultrasound and a CT of the abdomen and pelvis.  His CT revealed a Jennings 

catheter in place.  He was noted to have diffuse scrotal edema with peripherally

enhancing fluid collection on the left side surrounding the testis, consistent 

with a pyocele or early abscess.  He had a very small process on the right side.

 The testes themselves appeared grossly normal.  There was no air in the soft 

tissue surrounding the scrotum or perineal tissue.  A testicular ultrasound 

revealed "complex bilateral fluid hydroceles, left greater than right, with 

increased vascularity bilaterally."  



PAST MEDICAL HISTORY 

Low back pain. 



PAST SURGICAL HISTORY 

None except I and D of the scrotum recently.  



MEDICINES

1.  Amoxicillin. 

2.  Tramadol. 



ALLERGIES

No known drug allergies. 



SOCIAL HISTORY 

Patient lives in [place], [place].  



REVIEW OF SYSTEMS

Patient denies prior UTI, kidney stones, gross hematuria, nausea, vomiting, 

chest pain, shortness of breath, or bleeding disorder.  



PHYSICAL EXAMINATION 

GENERAL:  Patient is an obese male, examined in the Emergency Room on the bed 

gantry.  

HEENT:  Normocephalic, atraumatic.  He has poor dentition.

CARDIOVASCULAR:  Regular rate and rhythm. 

ABDOMEN:  Soft, nontender, obese.  No masses are palpated.  

GENITOURINARY:  Jennings catheter in place.  He has a prominent suprapubic fat pad 

resulting in a hidden penis.  He has bilateral erythema and moderate swelling of

both naomi-scrotums.  There is a 1 cm I and D site on the anterior right scrotum 

with a small eschar.  It is not currently draining any material.  On the left, 

it is moderately tender to palpation.  The skin is not fixed to the underlying 

tissues.  There is no crepitus bilaterally.  He has no pain in the posterior 

scrotum or perineal area and no pain in the groin.  He has some mild shotty 

adenopathy in the bilateral groins.  

EXTREMITIES:  Without clubbing, cyanosis, or edema. 

NEUROLOGIC:  Nonfocal.



IMPRESSION

A 38-year-old white male with bilateral infected hydrocele vent or early 

epididymal orchitis.  It appears he also has undiagnosed diabetes, which is 

currently in poor control. 

 

RECOMMENDATIONS

The patient likely needs to be admitted to the Internal Medicine Service for 

acute control of his diabetes and poor hemodynamic stability with broad-spectrum

antibiotics.  I will perform a local scrotal exploration and I and D of abscess 

at bedside with possible need for OR intervention with formal I and D, 

debridement, and irrigation.  Currently, there is no evidence of necrotizing 

fasciitis in the scrotum or perineum, and this did not appear to be involving 

the periurethral tissues, but appears to be more emanating from the testis and 

epididymis.  Also obtain a urine culture from recently placed Jennings catheter, 

which will eliminate his urine specimen which was consistent with contamination.



KRISTI

## 2019-07-19 NOTE — RADIOLOGY IMAGING REPORT
FACILITY: [facility] 

 

PATIENT NAME: Saturnino Dill

: 1980

MR: [id number]

V: [id number]

EXAM DATE: 

ORDERING PHYSICIAN: YRIS ROMAN

TECHNOLOGIST: 

 

[place] [facility] [place]

Patient: Saturnino Dill

: 1980

MRN: [id number]

Visit/Account:[id number]

Date of Sevice:  2019

 

ACCESSION #: [id number]

 

EXAMINATION: CT chest, abdomen, and pelvis with IV contrast

 

HISTORY: Sepsis. History of scrotal abscess, abdominal pain, AMS

 

TECHNIQUE:   Axial CT images of the chest, abdomen, and pelvis were obtained with IV contrast, with c
oronal and sagittal 2D reconstructed images.

One of the following dose optimization techniques was utilized in the performance of this exam: Autom
ated exposure control; adjustment of the mA and/or kV according to the patient's size; or use of an i
terative  reconstruction technique.  Specific details can be referenced in the facility's radiology C
T exam operational policy.

Contrast:  75 mL of IV Isovue-370.

 

COMPARISON:   Right groin ultrasound 7/15/2019.

 

FINDINGS:

 

Chest:

Lungs and pleura:  The lungs are clear. No focal consolidation. No pleural effusion or pneumothorax. 
The central airways are patent.

 

Mediastinum and raymond:  Negative.

 

Heart, aorta, and great vessels:  Normal caliber thoracic aorta. Normal heart size. No pericardial ef
fusion.

 

Chest lymph node assessment:  Negative.

 

Bones:  Negative.

 

Chest wall:  Negative.

 

Lower neck:  Negative.

 

 

Abdomen/pelvis:

Liver:  Fatty infiltration of the liver.

 

Gallbladder and bile ducts:  Negative.

 

Spleen:  Negative.

 

Pancreas:  Negative.

 

Adrenal glands:  Negative.

 

Kidneys:  Normal size and morphology of both kidneys. The kidneys enhance normally. No urinary calcul
i or hydronephrosis.

 

Bowel and peritoneum:  The small bowel and colon are normal in caliber, without evidence of obstructi
on or any focal inflammatory process. No localized bowel wall thickening. Normal appendix. No free fl
uid or free intraperitoneal air.

 

Pelvic  structures:  The urinary bladder is decompressed with a Jeninngs catheter in place. There is d
iffuse soft tissue edema along the scrotum and perineum. No soft tissue gas. There is a complex perip
herally enhancing fluid collection in the left hemiscrotum surrounding the left testicle which may re
present a complex hydrocele, pyocele, or abscess. This measures approximately 8.0 x 4.3 x 6.8 cm. No 
scrotal gas. There is a smaller right-sided likely complex hydrocele or pyocele partially surrounding
 the right testicle. Scrotal findings could be further evaluated with ultrasound. No discrete CT evid
ence of a soft tissue abscess at either groin. There is some nonloculated fluid and edema tracking garcia
periorly along the right groin.

 

Lymph node assessment:  Negative.

 

Vessels:  Negative.

 

Musculoskeletal:   Scattered degenerative changes along the lumbar spine. No acute osseous findings.

 

Body wall: Abdominal wall structures appear intact. There is some nonloculated fluid tracking along t
he right groin.

 

IMPRESSION:

1. There is diffuse scrotal edema with a moderate sized peripherally enhancing fluid collection in th
e left hemiscrotum surrounding the left testicle which may represent complex hydrocele, pyocele, or a
bscess. Smaller right-sided peritesticular fluid collection.

2. Soft tissue edema and stranding tracks posteriorly along the perineum and superiorly along the rig
ht groin. No scrotal or perineal gas.

3. No other acute findings in the chest, abdomen, or pelvis.

4. Hepatic steatosis.

 

 

Report Dictated By: Tonio Oscar MD at 2019 2:44 PM

 

Report E-Signed By: Tonio Oscar MD  at 2019 2:56 PM

 

WSN:[id number]

## 2019-07-19 NOTE — HISTORY & PHYSICAL
History of Present Illness


History of Present Illness


37yo obese male who came to the ER for confusion, fever, and decreased appetite.


 About 4 months ago, he noted 3 pimples on the left side of his scrotum.  He 


popped them and he did fine afterwards.  Around the same time, he noted 


increasing thirst.  About a week ago, he started having swelling and pain around


the right scrotum.  4 days ago (7/15), he went to the ER and was found to have 


right sided scrotal abscess.  He was put on Augmentin and then saw Dr. Ullrich 2


days ago (7/17).  The scrotum had increased in size by then.  It was drained and


then packed.  He had a lot of drainage.  He has had a fever for the last 2 days.


 He was incontinent of urine last night.  Today, he was more nauseated, confused


and febrile.  He was brought to the ER.  His significant other has noted 


unintentional weight loss over the last couple of months.  He denies vision 


changes.





In the ER, he was found to be tachy to 129 bpm, and a temperature of 99.6.  SBP 


has been in the 90's.  He was given 2.5 liters of fluid, the left scrotum was 


I&D'd by Dr. Cruz, Ertapenem was started.  His heart rate has come down to the


100's.  Glucose was 577.  He was given 10 units of insulin and started on an 


insulin drip before transfer to the ICU.





History


Problems:  


(1) Sciatic leg pain


Status:  Acute


(2) Obesity, Class III, BMI 40-49.9 (morbid obesity)


Status:  Chronic


Home Meds


Active Scripts


Tramadol Hcl (TRAMADOL HCL) 50 Mg Tablet, 1-2 TAB PO Q4H PRN for PAIN, #20 TAB 0


Refills


   Prov:ULLRICH,JOHN A MD         7/17/19


Amoxicillin/Pot Clav 875-125 Mg Tab (AUGMENTIN 875-125 TABLET) 1 Each Tablet, 1 


TAB PO Q12H for 7 Days, #14 TAB


   Prov:DARIANA SPARKS MD         7/15/19


Discontinued Scripts


Hydrocodone Bit/Acetaminophen (HYDROCODON-ACETAMINOPHEN 5-325) 1 Each Tablet, 1 


EACH PO Q4-6H PRN for PAIN, #6 TAB


   Prov:MATTIE BISWAS         6/5/19


Allergies:  


Coded Allergies:  


     No Known Drug Allergies (Unverified , 7/15/19)


Other Social/Family Hx


Works in a kitchen.  Quit smoking 8 years ago, but smoked for 14 years at 


varying amounts.  Occasional alcohol use.





Review of Systems


All Systems Reviewed/Normal:  Yes, Except as Noted





Exam


Vital Signs





Vital Signs








  Date Time  Temp Pulse Resp B/P (MAP) Pulse Ox O2 Delivery O2 Flow Rate FiO2


 


7/19/19 17:00    93/45 (61)    


 


7/19/19 16:51  99 7  95   


 


7/19/19 13:03       2.0 


 


7/19/19 12:37 99.6     Room Air  








General Appearance:  Other (Eyes closed, breathing comfortably)


Neuro:  No Gross deficits (but sometimes gets days wrong when discussing hi


story.  Knows where he is and why he is here.)


Eyes:  PERRLA


ENT:  Moist Mucous Membranes


Cardiovascular:  Other (Borderline tachy.  Regular.)


Respiratory:  Clear to Auscultation


GI:  Abd Soft and Non-Tender (Obese.  No erythema in skin folds of abdomen)


:  Other (Large swollen testicles with packing in place bilaterally)


Extremities:  No Edema


Integumentary:  No Jaundice, No Cyanosis





Medical Decision Making


Data Points


Result Diagram:  


7/19/19 1254                                                                    


           7/19/19 1638














Item Value  Date Time


 


White Blood Count 9.0 k/uL 7/19/19 1254


 


Hemoglobin 14.7 g/dL 7/19/19 1254


 


Hematocrit 42.6 % 7/19/19 1254


 


Neutrophils (%) (Auto) 85.4 % H 7/19/19 1254


 


Platelet Count 192 K/uL 7/19/19 1254


 


Lymphocytes (%) (Auto) 8.8 % L 7/19/19 1254


 


Monocytes (%) (Auto) 5.0 % 7/19/19 1254


 


Eosinophils (%) (Auto) 0.1 % L 7/19/19 1254


 


Urine Leukocyte Esterase Large H 7/19/19 1254


 


Urine RBC 15 /HPF 7/19/19 1254


 


Urine WBC 30 /HPF 7/19/19 1254


 


Urine Squamous Epithelial Cells Many /LPF H 7/19/19 1254


 


Urine Urobilinogen 2.0 mg/dL 7/19/19 1254


 


Urine Ketones 20 mg/dL H 7/19/19 1254


 


Random Glucose 524 mg/dl *H 7/19/19 1638


 


Whole Blood Glucose 508 mg/DL *H 7/19/19 1623


 


Lactate 2.9 mmol/L H 7/19/19 1254


 


Lipase 515 U/L H 7/19/19 1254


 


Calcium Level 9.1 mg/dl 7/19/19 1254


 


Total Bilirubin 0.9 mg/dl 7/19/19 1254


 


Aspartate Amino Transf (AST/SGOT) 27 U/L 7/19/19 1254


 


Alanine Aminotransferase (ALT/SGPT) 25 U/L 7/19/19 1254


 


Alkaline Phosphatase 132 U/L H 7/19/19 1254


 


Venous Blood pH 7.30 L 7/19/19 1254


 


Venous Blood Partial Pressure CO2 29 mmHg 7/19/19 1254


 


Venous Blood Partial Pressure O2 64 mmHg 7/19/19 1254


 


Venous Blood Oxygen Saturation 91 % 7/19/19 1254


 


Venous Blood HCO3 14 mmol/L 7/19/19 1254











EKG / Imaging


Imaging


Testicular/Groin US - 1.  There are complex bilateral fluid hydroceles left 


greater than right.  Bilaterally, there is increased blood flow noted within the


testes and heterogeneously enlarged and vascular epididymides.  Findings are 


concerning for bilateral epididymal orchitis with bilateral hydroceles.  When 


compared to recent CT scan, diffuse inflammation and complex fluid collections 


are also noted on CT scan.  Recommend urology consultation.


2.  Interval resolution of complex fluid within the right groin.  No fluid 


collection is noted within the left groin.





Chest/Abd/Pelvis CT - 1. There is diffuse scrotal edema with a moderate sized 


peripherally enhancing fluid collection in the left hemiscrotum surrounding the 


left testicle which may represent complex hydrocele, pyocele, or abscess. 


Smaller right-sided peritesticular fluid collection.


2. Soft tissue edema and stranding tracks posteriorly along the perineum and 


superiorly along the right groin. No scrotal or perineal gas.


3. No other acute findings in the chest, abdomen, or pelvis.


4. Hepatic steatosis.





Assessment and Plan


Problems:  


(1) Sepsis


Status:  Acute


Assessment & Plan:  He presented with a fever for 2 days, worsening nausea, and 


confusion today.  He had an elevated lactate, tachycardia, SBP in the 90's and a


neutrophil predominance.  He was given 2.5 liters of crystalloid in the ER.  HR 


has come down to the 's.  His mental status is improving.  Will recheck 


lactate.  Cultures pending and treatment has begun.  See below.





(2) DKA (diabetic ketoacidoses)


Status:  Acute


Assessment & Plan:  He presented with 4 months of increased UOP and 2 months of 


unintentional weight loss.  His AG is 19, VBG pH of 7.3, and a glucose of 577.  


He was given 10 units of IV insulin in the ER and started on a drip of 8 


units/hour.  HgA1c and C-Peptide tomorrow.  Once the infection has cleared, then


will have a better idea of treatment options.  Based on body habitus, this is 


likely T2DM, so he might be able to be on oral medications.





(3) Scrotal wall abscess


Status:  Acute


Assessment & Plan:  He had right sided scrotal symptoms for a week prior to 


admission.  He went to the ER and was started on Augmentin 4 days prior to 


admission.  It was drained by Dr. Ullrich 2 days prior to admission.  It is now 


bilateral and was I&D's by Dr. Cruz in the ER.  The patient has had wound, 


blood and urine cultures done.  Ertapenem was started in the ER and will be 


continued.





(4) Obesity, Class III, BMI 40-49.9 (morbid obesity)


Status:  Chronic


Copies to:   LETTY CRUZ MD; ULLRICH,JOHN A MD ;





Venous Thromboembolism


Antithrombotics


Is Pt On Any Antithrombotics?:  No





Exam


Sepsis Risk:  No Definite Risk











KRISTAL JOHNSON MD               Jul 19, 2019 17:54

## 2019-07-20 VITALS — SYSTOLIC BLOOD PRESSURE: 118 MMHG | DIASTOLIC BLOOD PRESSURE: 80 MMHG

## 2019-07-20 VITALS — SYSTOLIC BLOOD PRESSURE: 112 MMHG | DIASTOLIC BLOOD PRESSURE: 65 MMHG

## 2019-07-20 VITALS — SYSTOLIC BLOOD PRESSURE: 122 MMHG | DIASTOLIC BLOOD PRESSURE: 77 MMHG

## 2019-07-20 VITALS — DIASTOLIC BLOOD PRESSURE: 77 MMHG | SYSTOLIC BLOOD PRESSURE: 113 MMHG

## 2019-07-20 VITALS — SYSTOLIC BLOOD PRESSURE: 115 MMHG | DIASTOLIC BLOOD PRESSURE: 76 MMHG

## 2019-07-20 VITALS — DIASTOLIC BLOOD PRESSURE: 76 MMHG | SYSTOLIC BLOOD PRESSURE: 118 MMHG

## 2019-07-20 VITALS — SYSTOLIC BLOOD PRESSURE: 135 MMHG | DIASTOLIC BLOOD PRESSURE: 83 MMHG

## 2019-07-20 VITALS — SYSTOLIC BLOOD PRESSURE: 115 MMHG | DIASTOLIC BLOOD PRESSURE: 71 MMHG

## 2019-07-20 VITALS — DIASTOLIC BLOOD PRESSURE: 67 MMHG | SYSTOLIC BLOOD PRESSURE: 106 MMHG

## 2019-07-20 VITALS — SYSTOLIC BLOOD PRESSURE: 109 MMHG | DIASTOLIC BLOOD PRESSURE: 65 MMHG

## 2019-07-20 VITALS — DIASTOLIC BLOOD PRESSURE: 71 MMHG | SYSTOLIC BLOOD PRESSURE: 115 MMHG

## 2019-07-20 VITALS — DIASTOLIC BLOOD PRESSURE: 67 MMHG | SYSTOLIC BLOOD PRESSURE: 108 MMHG

## 2019-07-20 VITALS — DIASTOLIC BLOOD PRESSURE: 78 MMHG | SYSTOLIC BLOOD PRESSURE: 118 MMHG

## 2019-07-20 VITALS — DIASTOLIC BLOOD PRESSURE: 68 MMHG | SYSTOLIC BLOOD PRESSURE: 112 MMHG

## 2019-07-20 VITALS — SYSTOLIC BLOOD PRESSURE: 113 MMHG | DIASTOLIC BLOOD PRESSURE: 77 MMHG

## 2019-07-20 VITALS — DIASTOLIC BLOOD PRESSURE: 70 MMHG | SYSTOLIC BLOOD PRESSURE: 107 MMHG

## 2019-07-20 VITALS — DIASTOLIC BLOOD PRESSURE: 67 MMHG | SYSTOLIC BLOOD PRESSURE: 107 MMHG

## 2019-07-20 VITALS — SYSTOLIC BLOOD PRESSURE: 107 MMHG | DIASTOLIC BLOOD PRESSURE: 72 MMHG

## 2019-07-20 VITALS — SYSTOLIC BLOOD PRESSURE: 107 MMHG | DIASTOLIC BLOOD PRESSURE: 70 MMHG

## 2019-07-20 VITALS — SYSTOLIC BLOOD PRESSURE: 111 MMHG | DIASTOLIC BLOOD PRESSURE: 72 MMHG

## 2019-07-20 VITALS — DIASTOLIC BLOOD PRESSURE: 72 MMHG | SYSTOLIC BLOOD PRESSURE: 121 MMHG

## 2019-07-20 VITALS — SYSTOLIC BLOOD PRESSURE: 124 MMHG | DIASTOLIC BLOOD PRESSURE: 77 MMHG

## 2019-07-20 VITALS — SYSTOLIC BLOOD PRESSURE: 110 MMHG | DIASTOLIC BLOOD PRESSURE: 59 MMHG

## 2019-07-20 VITALS — DIASTOLIC BLOOD PRESSURE: 74 MMHG | SYSTOLIC BLOOD PRESSURE: 123 MMHG

## 2019-07-20 VITALS — DIASTOLIC BLOOD PRESSURE: 81 MMHG | SYSTOLIC BLOOD PRESSURE: 128 MMHG

## 2019-07-20 VITALS — SYSTOLIC BLOOD PRESSURE: 115 MMHG | DIASTOLIC BLOOD PRESSURE: 68 MMHG

## 2019-07-20 VITALS — DIASTOLIC BLOOD PRESSURE: 75 MMHG | SYSTOLIC BLOOD PRESSURE: 123 MMHG

## 2019-07-20 VITALS — DIASTOLIC BLOOD PRESSURE: 65 MMHG | SYSTOLIC BLOOD PRESSURE: 107 MMHG

## 2019-07-20 VITALS — SYSTOLIC BLOOD PRESSURE: 112 MMHG | DIASTOLIC BLOOD PRESSURE: 74 MMHG

## 2019-07-20 LAB — PLATELET COUNT, AUTOMATED: 158 K/UL (ref 150–450)

## 2019-07-20 RX ADMIN — ACETAMINOPHEN PRN MLS/HR: 10 INJECTION, SOLUTION INTRAVENOUS at 02:33

## 2019-07-20 RX ADMIN — DEXTROSE MONOHYDRATE, SODIUM CHLORIDE, AND POTASSIUM CHLORIDE PRN MLS/HR: 50; 4.5; 1.49 INJECTION, SOLUTION INTRAVENOUS at 10:36

## 2019-07-20 RX ADMIN — KETOROLAC TROMETHAMINE PRN MG: 30 INJECTION, SOLUTION INTRAMUSCULAR; INTRAVENOUS at 09:07

## 2019-07-20 RX ADMIN — SODIUM CHLORIDE AND POTASSIUM CHLORIDE SCH MLS/HR: 9; 1.49 INJECTION, SOLUTION INTRAVENOUS at 19:04

## 2019-07-20 RX ADMIN — SODIUM CHLORIDE AND POTASSIUM CHLORIDE SCH MLS/HR: 9; 1.49 INJECTION, SOLUTION INTRAVENOUS at 02:39

## 2019-07-20 RX ADMIN — SODIUM CHLORIDE AND POTASSIUM CHLORIDE SCH MLS/HR: 9; 1.49 INJECTION, SOLUTION INTRAVENOUS at 14:05

## 2019-07-20 RX ADMIN — ACETAMINOPHEN PRN MLS/HR: 10 INJECTION, SOLUTION INTRAVENOUS at 22:14

## 2019-07-20 RX ADMIN — DEXTROSE MONOHYDRATE, SODIUM CHLORIDE, AND POTASSIUM CHLORIDE PRN MLS/HR: 50; 4.5; 1.49 INJECTION, SOLUTION INTRAVENOUS at 15:05

## 2019-07-20 RX ADMIN — SODIUM CHLORIDE AND POTASSIUM CHLORIDE SCH MLS/HR: 9; 1.49 INJECTION, SOLUTION INTRAVENOUS at 22:05

## 2019-07-20 RX ADMIN — SODIUM CHLORIDE AND POTASSIUM CHLORIDE SCH MLS/HR: 9; 1.49 INJECTION, SOLUTION INTRAVENOUS at 06:31

## 2019-07-20 RX ADMIN — KETOROLAC TROMETHAMINE PRN MG: 30 INJECTION, SOLUTION INTRAMUSCULAR; INTRAVENOUS at 15:04

## 2019-07-20 RX ADMIN — SODIUM CHLORIDE SCH MLS/HR: 900 INJECTION, SOLUTION INTRAVENOUS at 13:08

## 2019-07-20 RX ADMIN — SODIUM CHLORIDE AND POTASSIUM CHLORIDE SCH MLS/HR: 9; 1.49 INJECTION, SOLUTION INTRAVENOUS at 10:05

## 2019-07-20 NOTE — MEDICAL NUTRITION THERAPY
Nutrition Anthropometrics


Height (Inches):  69.00


Height (Calculated Centimeters:  175.288933


Weight (Pounds):  143


Weight (Calculated Kilograms):  65.091


Amol Nutrition Score:         Probably Inadequate 


Amol Nutrition Risk Score:  16


Dietary Referral


Nutrition Risk Factors:      


Nutrition Risk Comment:





Physical Findings


Physical Appearance:  Morbidly Obese 40+ (Correct wt is 143kg)


Skin Appearance


Skin Appearance:


Edema


Edema Location Modifier: Both 


Edema Location:              Scrotal 


Type of Edema:                 


Degree of Edema:


Gastrointestinal Symptoms


GI Symtoms:                Appetite Changes 


Tube Present:               


Bowel Sounds:              


Recent Bowel Pattern:    


Stool Characteristics:





Nutritional Diagnosis


Nutritional Risk Acuity 2:  Blood Glucose > 300mg/dl, Sepsis, DKA


Nutritional Risk Acuity 3:  Morbid Obesity


Nutritional Risk Acuity 4:  Modified Diet


Past Medical History:  


Sciatic leg pain, obesity


Nutritional Acuity:  2-Moderate


Nutrition Diagnosis:  Inadequate Food Intake


Nutrition Etiology:  Increased Nutrient Needs


Nutrition Problem/Etiology/Sym:  


Inadequate food intake as related to increased nutrient needs as evidenced


by sepsis and reported unintentional wt loss.


Energy Requirement:  3076 (m st jeor x 1.1 x 1.2)


Adjusted Energy Requirement Re:  2576 (-500kcal for 1# wt loss per week)


Protein Requirement:  143 (1 g protein/kg)


Fluid Requirement:  3076 (1mL/1kcal)


Diet Type:  NPO/Ice Chips Only


Nutrition Intervention:  Incr diet as tolerated, Check glucose





Nutrition Monitoring & Eval


Nutritional Goals Comment:  


Progress from NPO to ADA diet as tolerated.


RD Patient Assessment Time:  30 minutes


RD Assessment Type:  RD Assessment


Patient Nutrition Acuity:  2-Moderate


Follow Up Date:  Jul 22, 2019


Nutritional Comment:  


Pt admitted with confusion, fever, and decreased appetite. Glucose in the


ER was 577. Significant other reports unintentional wt loss. Dx with


sepsis, DKA, and scrotal wall


abcess. Hx of sciatic leg pain and obesity. Currently NPO/Ice chips only


day 1. Recommend ADA diet when PO is tolerated. Pt meds include: insulin,


enoxaparin, and IV KCl/NaCl. Blood glucose has ranged from 346-271 and


urinary ketones are elevated at 20. Pt does has non-pitting edema in both


scrotal. Monitor for progression of diet and blood glucose levels. -JOSÉ MENDOZA               Jul 20, 2019 10:14

## 2019-07-20 NOTE — HOSPITALIST PROGRESS NOTE
Subjective


Progress Notes


Subjective


This patient was admitted for DKA and scrotal abscess.





Patient Complains of:


Cardiovascular:  No: Chest Pain


Respiratory:  No: Shortness of Breath





Physical Exam





Vital Signs








  Date Time  Temp Pulse Resp B/P (MAP) Pulse Ox O2 Delivery O2 Flow Rate FiO2


 


7/20/19 08:00  90 15 122/77 (92) 94 Nasal Cannula 1.0 


 


7/20/19 07:00 98.8       














Intake and Output 


 


 7/20/19





 07:03


 


Intake Total 5878.1 ml


 


Output Total 1105 ml


 


Balance 4773.1 ml


 


 


 


Intake IV Total 5878.1 ml


 


Output Urine Total 1105 ml








Cardiovascular:  Regular Rate and Rhythm


Respiratory:  Clear to Auscultation


Result Diagram:  


7/20/19 0449 7/20/19 0449








Assessment and Plan


Problems:  


(1) DKA (diabetic ketoacidoses)


Status:  Acute


Assessment & Plan:  He did present with hyperglycemia and an elevated anion gap.


 He has no previous history of diabetes.  He remains on an insulin infusion and 


fluid resuscitation.  





(2) Sepsis


Status:  Acute


Assessment & Plan:  He did have an elevated lactate at admission.  This has 


improved with IV fluids.





(3) Scrotal wall abscess


Status:  Acute


Assessment & Plan:  Dr. Floyd performed an I&D yesterday.  He is on treatment 


with ertapenem.





(4) Acute pancreatitis


Assessment & Plan:  His lipase did increase overnight.  He is currently NPO.





(5) Obesity, Class III, BMI 40-49.9 (morbid obesity)


Status:  Chronic





Exam


Sepsis Risk:  No Definite Risk











SHANAE DUMONT DO                  Jul 20, 2019 08:49

## 2019-07-21 ENCOUNTER — HOSPITAL ENCOUNTER (OUTPATIENT)
Dept: HOSPITAL 89 - AMB | Age: 39
End: 2019-07-21

## 2019-07-21 VITALS — DIASTOLIC BLOOD PRESSURE: 71 MMHG | SYSTOLIC BLOOD PRESSURE: 135 MMHG

## 2019-07-21 VITALS — DIASTOLIC BLOOD PRESSURE: 77 MMHG | SYSTOLIC BLOOD PRESSURE: 138 MMHG

## 2019-07-21 VITALS — SYSTOLIC BLOOD PRESSURE: 94 MMHG | DIASTOLIC BLOOD PRESSURE: 64 MMHG

## 2019-07-21 VITALS — DIASTOLIC BLOOD PRESSURE: 85 MMHG | SYSTOLIC BLOOD PRESSURE: 134 MMHG

## 2019-07-21 VITALS — SYSTOLIC BLOOD PRESSURE: 116 MMHG | DIASTOLIC BLOOD PRESSURE: 86 MMHG

## 2019-07-21 VITALS — SYSTOLIC BLOOD PRESSURE: 142 MMHG | DIASTOLIC BLOOD PRESSURE: 88 MMHG

## 2019-07-21 VITALS — DIASTOLIC BLOOD PRESSURE: 90 MMHG | SYSTOLIC BLOOD PRESSURE: 122 MMHG

## 2019-07-21 VITALS — SYSTOLIC BLOOD PRESSURE: 134 MMHG | DIASTOLIC BLOOD PRESSURE: 74 MMHG

## 2019-07-21 VITALS — SYSTOLIC BLOOD PRESSURE: 123 MMHG | DIASTOLIC BLOOD PRESSURE: 72 MMHG

## 2019-07-21 VITALS — SYSTOLIC BLOOD PRESSURE: 131 MMHG | DIASTOLIC BLOOD PRESSURE: 77 MMHG

## 2019-07-21 VITALS — DIASTOLIC BLOOD PRESSURE: 84 MMHG | SYSTOLIC BLOOD PRESSURE: 125 MMHG

## 2019-07-21 VITALS — DIASTOLIC BLOOD PRESSURE: 67 MMHG | SYSTOLIC BLOOD PRESSURE: 116 MMHG

## 2019-07-21 VITALS — SYSTOLIC BLOOD PRESSURE: 107 MMHG | DIASTOLIC BLOOD PRESSURE: 65 MMHG

## 2019-07-21 DIAGNOSIS — Z02.9: Primary | ICD-10-CM

## 2019-07-21 LAB — PLATELET COUNT, AUTOMATED: 191 K/UL (ref 150–450)

## 2019-07-21 PROCEDURE — 0VB5XZZ EXCISION OF SCROTUM, EXTERNAL APPROACH: ICD-10-PCS | Performed by: SURGERY

## 2019-07-21 PROCEDURE — 02HV33Z INSERTION OF INFUSION DEVICE INTO SUPERIOR VENA CAVA, PERCUTANEOUS APPROACH: ICD-10-PCS | Performed by: SURGERY

## 2019-07-21 PROCEDURE — 0MBK0ZZ EXCISION OF PERINEUM BURSA AND LIGAMENT, OPEN APPROACH: ICD-10-PCS | Performed by: UROLOGY

## 2019-07-21 PROCEDURE — 0TJB8ZZ INSPECTION OF BLADDER, VIA NATURAL OR ARTIFICIAL OPENING ENDOSCOPIC: ICD-10-PCS | Performed by: UROLOGY

## 2019-07-21 RX ADMIN — DEXTROSE MONOHYDRATE, SODIUM CHLORIDE, AND POTASSIUM CHLORIDE PRN MLS/HR: 50; 4.5; 1.49 INJECTION, SOLUTION INTRAVENOUS at 02:50

## 2019-07-21 RX ADMIN — SODIUM CHLORIDE SCH MLS/HR: 900 INJECTION, SOLUTION INTRAVENOUS at 13:10

## 2019-07-21 RX ADMIN — ROPIVACAINE HYDROCHLORIDE ONE MCG: 2 INJECTION, SOLUTION EPIDURAL; INFILTRATION at 12:56

## 2019-07-21 RX ADMIN — SODIUM CHLORIDE SCH MLS/HR: 900 INJECTION, SOLUTION INTRAVENOUS at 13:00

## 2019-07-21 RX ADMIN — KETOROLAC TROMETHAMINE PRN MG: 30 INJECTION, SOLUTION INTRAMUSCULAR; INTRAVENOUS at 05:41

## 2019-07-21 NOTE — RADIOLOGY IMAGING REPORT
FACILITY: [facility] 

 

PATIENT NAME: Saturnino Dill

: 1980

MR: [id number]

V: [id number]

EXAM DATE: 

ORDERING PHYSICIAN: DEBBI MARTINEZ

TECHNOLOGIST: 

 

Location: [facility] - [place]

Patient: Saturnino Dill

: 1980

MRN: [id number]

Visit/Account:[id number]

Date of Sevice:  2019

 

ACCESSION #: [id number]

 

CHEST SINGLE AP

 

HISTORY: Placement of central line right side.

 

COMPARISON: None

 

FINDINGS:

 

Cardiomediastinal contours: The heart size is normal.

Lungs and pleura: There is no pneumothorax or pleural effusion following placement of right internal 
jugular vein catheter.

Bones/soft tissues: There are no findings of a fracture.

Catheter: There has been placement of a right internal jugular vein catheter. The tip of the catheter
 is at or near the cavoatrial junction.

 

IMPRESSION:

1. Placement of right internal jugular vein triple-lumen catheter.

2. No findings of pneumothorax or pleural effusion.

 

 

Report Dictated By: Eric Mckinney MD at 2019 1:41 PM

 

Report E-Signed By: Eric Mckinney MD  at 2019 1:41 PM

 

WSN:[id number]

## 2019-07-21 NOTE — ANTIMICROBIAL STEWARDSHIP
Antimicrobial Time Out


Antimicrobial Stewardship


MD Service:  Hospitalist


Indications:  Other (Scrotal abscess)


Antimicrobial Used


Invanz started on 7/19 and Vancomycin added on 7/21 due to patient spiking 


fevers and increasing WBC.


Start Date:  Jul 19, 2019


Culture Results:  No (cultures pending)





Eligible for PO Conversion


Eligable for PO Conversion:  No











IRAIS ARNOLD                Jul 21, 2019 11:08

## 2019-07-21 NOTE — HOSPITALIST DEPART
Discharge Summary


Reason for Hosp/Final Diag:  


(1) Scrotal wall abscess


Status:  Acute


Hospital Course & Plan:  He had right sided scrotal symptoms for a week prior to


admission.  He went to the ER and was started on Augmentin 4 days prior to 


admission.  It was drained by Dr. Ullrich 2 days prior to admission.  CT and US 


in the ER confirmed that it was bilateral involvement and no gas or air seen.   


He had bedside I&D'd by Dr. Floyd (Urology) on 7/19 and 7/20.  The patient has 


had wound, blood and urine cultures.  The testicular abscess aspiration 


initially showed many GPC and few GNR and the updated read is just reporting 


"checking for possible pathogens".  The groin wound culture gram stain was 


initially read as many GPC and GNR, but was updated today to just GPC growth in 


the Thio Broth.  Blood cultures are without growth to date.  He was started on 


treatment with ertapenem in the ER and this was continued.  





Today, his WBC increased and he continued to spike fevers.  Vancomycin was 


added.  He went to the OR this morning and was found to have much purulent 


material into the bilateral inguinal canals.  He had extensive scrotal 


debridement.  General surgery was consulted to the OR to evaluate for a pe


rirectal source which there did not appear to be any, per Dr. Vivar.  


Necrotizing fascitis is a possibility given the gram stain reports and the 


extensiveness of the infection.  Dr. Floyd recommended the patient be 


transferred to a higher level of care because the patient would likely need 


extensive debridement and multiple surgeries that were beyond our hospitals 


expertise.  Dr. Porter is the accepting surgeon at [facility] [place].





Lovenox 40mg a day was held this morning.  Protonix IV for stress ulcer 


prophylaxis.  IV Ketoralac and Acetaminophen for pain.





(2) DKA (diabetic ketoacidoses)


Status:  Acute


Hospital Course & Plan:  He presented with 4 months of increased UOP and 2 


months of unintentional weight loss.  In the ER, he had an AG of 19, VBG pH of 


7.3, and a glucose of 577.  He has no previous history of diabetes.  HgA1c and 


C-Peptide are pending.  His AG is now closed.  Bicarbonate is still low.  


Glucose since midnight is 229-291.  He remains on an insulin infusion (i.e. 4 


units/hour) because of the possibility of going to surgery and remaining NPO.  


He is on D51/2NS with 20mEq KCL at 100cc/hour.





(3) Sepsis


Status:  Acute


Hospital Course & Plan:  He presented with a fever for 2 days, worsening nausea,


and confusion the day of admission.  He had an elevated lactate, tachycardia, 


SBP in the 90's and a neutrophil predominance.  Lactate has improved.  BP has 


remained normal.  Prior to surgery his heart rate normalized, but was elevated 


to the 130's during the case.  Now, it is in the low 100's. 





(4) Elevated lipase


Status:  Acute


Hospital Course & Plan:  It continues to rise since admission.  CT upon 


admission didn't note any signs of pancreatitis.  He denies any abdominal pain 


and has a benign abdominal exam.  Certainly, DKA could cause it to be elevated, 


but would expect it to be coming down.  The patient has been NPO since 


admission.  Amylase was added to this mornings labs was elevated.  Continue to 


follow lipase and symptoms.





(5) Obesity, Class III, BMI 40-49.9 (morbid obesity)


Status:  Chronic


Departure


Weight (Pounds):  330


Weight (Ounces):  8.0


Result Diagram:  


7/21/19 0613 7/21/19 0613














Item Value  Date Time


 


Sodium Level 127 mmol/L L 7/19/19 1254


 


Potassium Level 3.9 mmol/L 7/19/19 1254


 


Chloride Level 92 mmol/L L 7/19/19 1254


 


Carbon Dioxide Level 16 mmol/L L 7/19/19 1254


 


Blood Urea Nitrogen 23 mg/dl H 7/19/19 1254


 


Creatinine 0.80 mg/dl 7/19/19 1254


 


Glomerular Filtration Rate Calc > 60.0 7/19/19 1254


 


Random Glucose 577 mg/dl *H 7/19/19 1254


 


Lactate 2.9 mmol/L H 7/19/19 1254


 


Lactate 1.9 mmol/L 7/19/19 1756


 


Calcium Level 9.1 mg/dl 7/19/19 1254


 


Total Bilirubin 0.9 mg/dl 7/19/19 1254


 


Aspartate Amino Transf (AST/SGOT) 27 U/L 7/19/19 1254


 


Alanine Aminotransferase (ALT/SGPT) 25 U/L 7/19/19 1254


 


Alkaline Phosphatase 132 U/L H 7/19/19 1254


 


Total Protein 7.1 g/dl 7/19/19 1254


 


Lipase 515 U/L H 7/19/19 1254


 


Albumin 3.3 g/dl L 7/19/19 1254


 


Whole Blood Glucose 508 mg/DL *H 7/19/19 1623


 


Random Glucose 524 mg/dl *H 7/19/19 1638


 


Sodium Level 130 mmol/L L 7/19/19 1756


 


Chloride Level 98 mmol/L 7/19/19 1756


 


Potassium Level 3.5 mmol/L 7/19/19 1756


 


Carbon Dioxide Level 17 mmol/L L 7/19/19 1756


 


Blood Urea Nitrogen 26 mg/dl H 7/19/19 1756


 


Creatinine 0.80 mg/dl 7/19/19 1756


 


Random Glucose 499 mg/dl H 7/19/19 1756


 


Lipase 1198 U/L H 7/20/19 0449


 


Total Protein 5.1 g/dl L 7/20/19 0449


 


Albumin 2.1 g/dl L 7/20/19 0449


 


Alkaline Phosphatase 87 U/L 7/20/19 0449


 


Alanine Aminotransferase (ALT/SGPT) 26 U/L 7/20/19 0449


 


Aspartate Amino Transf (AST/SGOT) 23 U/L 7/20/19 0449


 


Total Bilirubin 0.5 mg/dl 7/20/19 0449


 


Magnesium Level 2.3 mg/dl H 7/20/19 0449


 


Calcium Level 7.8 mg/dl L 7/20/19 0449


 


Random Glucose 365 mg/dl H 7/20/19 0449


 


Creatinine 0.50 mg/dl L 7/20/19 0449


 


Blood Urea Nitrogen 24 mg/dl H 7/20/19 0449


 


Chloride Level 104 mmol/L 7/20/19 0449


 


Potassium Level 3.9 mmol/L 7/20/19 0449


 


Sodium Level 133 mmol/L L 7/20/19 0449


 


Carbon Dioxide Level 18 mmol/L L 7/20/19 0449


 


Lipase 2343 U/L H 7/21/19 0613


 


Amylase Level 165 U/L H 7/21/19 0613


 


Albumin 2.0 g/dl L 7/21/19 0613


 


Total Protein 5.1 g/dl L 7/21/19 0613


 


Total Bilirubin 0.5 mg/dl 7/21/19 0613


 


Aspartate Amino Transf (AST/SGOT) 32 U/L 7/21/19 0613


 


Alanine Aminotransferase (ALT/SGPT) 49 U/L 7/21/19 0613


 


Alkaline Phosphatase 108 U/L 7/21/19 0613


 


Sodium Level 133 mmol/L L 7/21/19 0613


 


Potassium Level 3.9 mmol/L 7/21/19 0613


 


Chloride Level 108 mmol/L H 7/21/19 0613


 


Carbon Dioxide Level 17 mmol/L L 7/21/19 0613


 


Blood Urea Nitrogen 9 mg/dl 7/21/19 0613


 


Creatinine 0.40 mg/dl L 7/21/19 0613


 


Random Glucose 291 mg/dl H 7/21/19 0613


 


Calcium Level 7.4 mg/dl L 7/21/19 0613


 


Urine Leukocyte Esterase Large H 7/19/19 1254


 


Urine RBC 15 /HPF 7/19/19 1254


 


Urine WBC 30 /HPF 7/19/19 1254


 


Urine Squamous Epithelial Cells Many /LPF H 7/19/19 1254


 


Urine Urobilinogen 2.0 mg/dL 7/19/19 1254


 


Urine Ketones 20 mg/dL H 7/19/19 1254


 


Urine Glucose (UA) 500 mg/dL 7/19/19 1254


 


Urine Protein 30 mg/dL 7/19/19 1254


 


White Blood Count 9.0 k/uL 7/19/19 1254


 


White Blood Count 8.6 k/uL 7/20/19 0449


 


White Blood Count 11.7 k/uL H 7/21/19 0613


 


Hemoglobin 13.2 g/dL L 7/21/19 0613


 


Hemoglobin 12.6 g/dL L 7/20/19 0449


 


Hemoglobin 14.7 g/dL 7/19/19 1254


 


Mean Corpuscular Volume 88.3 fL 7/19/19 1254


 


Mean Corpuscular Volume 88.5 fL 7/20/19 0449


 


Mean Corpuscular Volume 87.5 fL 7/21/19 0613


 


Platelet Count 191 K/uL 7/21/19 0613


 


Platelet Count 158 K/uL 7/20/19 0449


 


Platelet Count 192 K/uL 7/19/19 1254


 


Neutrophils (%) (Auto) 85.4 % H 7/19/19 1254


 


Lymphocytes (%) (Auto) 8.8 % L 7/19/19 1254


 


Monocytes (%) (Auto) 5.0 % 7/19/19 1254


 


Eosinophils (%) (Auto) 0.1 % L 7/19/19 1254


 


Basophils (%) (Auto) 0.7 % 7/19/19 1254


 


Neutrophils % (Manual) 69 % 7/20/19 0449


 


Band Neutrophils % 8 % 7/20/19 0449


 


Lymphocytes % (Manual) 19 % 7/20/19 0449


 


Monocytes % (Manual) 4 % L 7/20/19 0449


 


Eosinophils % (Manual) 0 % L 7/20/19 0449


 


Neutrophils (%) (Auto) 83.0 % H 7/21/19 0613


 


Lymphocytes (%) (Auto) 11.6 % L 7/21/19 0613


 


Monocytes (%) (Auto) 4.6 % 7/21/19 0613


 


Eosinophils (%) (Auto) 0.5 % 7/21/19 0613


 


Basophils (%) (Auto) 0.3 % 7/21/19 0613


 


Venous Blood pH 7.30 L 7/19/19 1254


 


Venous Blood Partial Pressure CO2 29 mmHg 7/19/19 1254


 


Venous Blood Partial Pressure O2 64 mmHg 7/19/19 1254


 


Venous Blood HCO3 14 mmol/L 7/19/19 1254


 


Venous Blood Oxygen Saturation 91 % 7/19/19 1254








Blood Cultures from 7/19/19 have no growth to date

















SPEC #: [id number]        MANDEEP: 07/19/     STATUS:  RES            REQ 


#: [id number]


                            RECD: 07/19/     [facility] DR: MATTIE BISWAS    


          


SOURCE: ABSCESS             ENTR: 07/19/     [facility] DR: YRIS ROMAN DO          


[facility]  


          


ORDERED:  CULT WOUND/GS                                                         


 


COMMENTS: Additional Information: Testicular                          


------------------------------------------------------------------------------


--------------





  Procedure                         Result                         Verified     


         


--------------------------------------------------------------------------------


------------





  GRAM STAIN  Final                                                07/19/


                                    MANY GRAM POSITIVE COCCI


                                    MANY GRAM NEGATIVE RODS


                                    FEW GRAM POSITIVE RODS


                                    RARE EPITHELIAL CELLS


                                    RARE WHITE BLOOD CELLS





  WOUNDCULTURE  Preliminary                                        07/21/


     Organism 1                     GRAM POSITIVE COCCI


                                    GROWTH ONLY IN THIO BROTH, NO GROWTH ON 


PLATES


                                    ID AND SENSITIVITY TO FOLLOW





        IMMATURE GROWTH ON PLATES ON DAY 2


        REINCUBATED





SPEC #: [id number]        MANDEEP: 07/19/     STATUS:  RES            REQ 


#: [id number]


                            RECD: 07/19/     [facility] DR: MATTIE BISWAS    


          


SOURCE: PENIS               ENTR: 07/19/     [facility] DR: YRIS ROMAN DO          


[facility]:                                                      ULLRICH,JOHN A MD  


          


ORDERED:  CULT GENITAL/GS                                                       


 


COMMENTS: Comments:                                                   


          Aspiration from testicular abcess


----


--------------------------------------------------------------------------------


--------





  Procedure                         Result                         Verified     


         


-------------------


-------------------------------------------------------------------------





  GRAM STAIN  Final                                                07/19/


                                    MANY RED BLOOD CELLS


                                    MANY GRAM POSITIVE COCCI


                                    FEW GRAM NEGATIVE RODS


                                    RARE WHITE BLOOD CELLS





  GENITAL CULT  Preliminary                                        07/21/


        CHECKING FOR POSSIBLE PATHOGENS, WORKUP IN             


        PROGRESS. CULTURE REINCUBATED.                         








SPEC #: [id number]        MANDEEP: 07/19/     STATUS:  COMP           REQ 


#: [id number]


                            RECD: 07/19/     [facility] DR: YRIS ROMAN DO          


SOURCE: KATHRYN              ENTR: 07/19/     [facility] DR: ULLRICH,JOHN A MD  


          


[facility]:                                                                         


          


ORDERED:  CULT URINE                                                            


 


---------------------------------------------


-----------------------------------------------





  Procedure                         Result                         Verified     


         


------------------------------------------------------------


--------------------------------





  URINE CULTURE  Final                                             07/21/


        NO GROWTH AFTER 2 DAYS


Imaging


7/19/19 Testicular US - 1.  There are complex bilateral fluid hydroceles left 


greater than right.  Bilaterally, there is increased blood flow noted within the


testes and heterogeneously enlarged and vascular epididymides.  Findings are 


concerning for bilateral epididymal orchitis with bilateral hydroceles.  When 


compared to recent CT scan, diffuse inflammation and complex fluid collections 


are also noted on CT scan.  Recommend urology consultation.


2.  Interval resolution of complex fluid within the right groin.  No fluid 


collection is noted within the left groin.





7/19/19 Groin US - 1.  There are complex bilateral fluid hydroceles left greater


than right.  Bilaterally, there is increased blood flow noted within the testes 


and heterogeneously enlarged and vascular epididymides.  Findings are concerning


for bilateral epididymal orchitis with bilateral hydroceles.  When compared to 


recent CT scan, diffuse inflammation and complex fluid collections are also 


noted on CT scan.  Recommend urology consultation.


2.  Interval resolution of complex fluid within the right groin.  No fluid 


collection is noted within the left groin.





7/19/19 Chest/Abd/Pelvis CT - 1. There is diffuse scrotal edema with a moderate 


sized peripherally enhancing fluid collection in the left hemiscrotum 


surrounding the left testicle which may represent complex hydrocele, pyocele, or


abscess. Smaller right-sided peritesticular fluid collection.


2. Soft tissue edema and stranding tracks posteriorly along the perineum and 


superiorly along the right groin. No scrotal or perineal gas.


3. No other acute findings in the chest, abdomen, or pelvis.


4. Hepatic steatosis.


Condition:  Critical


Discharge:  Another Hospital





Discharge Instructions


Home Meds


Active Scripts


Tramadol Hcl (TRAMADOL HCL) 50 Mg Tablet, 1-2 TAB PO Q4H PRN for PAIN, #20 TAB 0


Refills


   Prov:ULLRICH,JOHN A MD         7/17/19


Amoxicillin/Pot Clav 875-125 Mg Tab (AUGMENTIN 875-125 TABLET) 1 Each Tablet, 1 


TAB PO Q12H for 7 Days, #14 TAB


   Prov:DARIANA SPARKS MD         7/15/19


Discontinued Scripts


Hydrocodone Bit/Acetaminophen (HYDROCODON-ACETAMINOPHEN 5-325) 1 Each Tablet, 1 


EACH PO Q4-6H PRN for PAIN, #6 TAB


   Prov:MATTIE BISWAS         6/5/19


Special Instructions:  


NPO





Venous Thromboembolism


Antithrombotics


Is Pt On Any Antithrombotics?:  No











KRISTAL JOHNSON MD               Jul 21, 2019 14:06

## 2019-07-21 NOTE — PROCEDURE NOTE
Central Line Procedure Note


Consent Signed:  No


Central Line Lumen:  Triple


Central Line Procedure:  Chlorhexidine Prep, Sterile Drapes Applied, Sterile 


Dressing Applied


Central Line Position:  R Internal Jugular


Anesthesia Used:  1% Lidocaine


CC's of Anesthesia:  2


Complications:  None


Central Line Post Position:  Sutured, Confirmed Blood Return, Position Confirmed


w/CXR











DEBBI MARTINEZ MD               Jul 21, 2019 12:39

## 2019-07-21 NOTE — HOSPITALIST PROGRESS NOTE
Subjective


Progress Notes


Subjective


He has had flank pain intermittently.  No chills, but fevers noted by staff.





Physical Exam





Vital Signs








  Date Time  Temp Pulse Resp B/P (MAP) Pulse Ox O2 Delivery O2 Flow Rate FiO2


 


7/21/19 06:00 101.2 95 13 142/88 (106) 94 Nasal Cannula 1.0 














Intake and Output 


 


 7/21/19





 07:03


 


Intake Total 6131.2 ml


 


Output Total 1730 ml


 


Balance 4401.2 ml


 


 


 


Intake IV Total 6131.2 ml


 


Output Urine Total 1730 ml


 


# Bowel Movements 1








General Appearance:  Alert, Awake, No Acute Distress


GI:  Soft and Non-Tender


Result Diagram:  


7/21/19 0613 7/21/19 0613








Assessment and Plan


Problems:  


(1) DKA (diabetic ketoacidoses)


Status:  Acute


Assessment & Plan:  He presented with 4 months of increased UOP and 2 months of 


unintentional weight loss.  In the ER, he had an AG of 19, VBG pH of 7.3, and a 


glucose of 577.  He has no previous history of diabetes.  HgA1c and C-Peptide 


are pending.  His AG is now closed.  Bicarbonate is still low.  Glucose since 


midnight is 229-291.  He remains on an insulin infusion because of the p


ossibility of going to surgery and remaining NPO.  Will increase the sliding 


scale that staff follow for the drip to low glucose.





(2) Scrotal wall abscess


Status:  Acute


Assessment & Plan:   He had right sided scrotal symptoms for a week prior to 


admission.  He went to the ER and was started on Augmentin 4 days prior to 


admission.  It was drained by Dr. Ullrich 2 days prior to admission.  It is now 


bilateral and was I&D's by Dr. Floyd on 7/19 and 7/20.  The patient has had 


wound, blood and urine cultures pending, but no growth to date.  He is on 


treatment with ertapenem.  His WBC increased and he continues to spike fevers.  


Vancomycin to be added today.





(3) Sepsis


Status:  Acute


Assessment & Plan:  He presented with a fever for 2 days, worsening nausea, and 


confusion the day of admission.  He had an elevated lactate, tachycardia, SBP in


the 90's and a neutrophil predominance.  This has improved with IV fluids.





(4) Elevated lipase


Status:  Acute


Assessment & Plan:  It continues to rise since admission.  CT upon admission 


didn't note any signs of pancreatitis.  He denies any abdominal pain and has a 


benign abdominal exam.  Certainly, DKA could cause it to be elevated, but would 


expect it to be coming down.  The patient has been NPO since admission.  Amylase


to be added to this mornings labs.  Continue to follow lipase and symptoms.





(5) Obesity, Class III, BMI 40-49.9 (morbid obesity)


Status:  Chronic





Exam


Sepsis Risk:  No Definite Risk











KRISTAL JOHNSON MD               Jul 21, 2019 08:03

## 2019-07-21 NOTE — OPERATIVE REPORT 1
EVENT DATE: July 21, 2019 

SURGEON: Evert Floyd MD 

ANESTHESIOLOGIST: Eliel Nathan MD 

ANESTHESIA: General.  

ASSISTANT: Steve Vivar MD 





PREOPERATIVE DIAGNOSIS  

Bilateral scrotal infection.  



POSTOPERATIVE DIAGNOSIS 

Bilateral groin, scrotal, and right perineal infection with abscess.  



PROCEDURE PERFORMED 

1.  Cystoscopy.  

2.  Bilateral scrotal exploration with debridement, including partial 

scrotectomy, and irrigation of wound.  



ESTIMATED BLOOD LOSS 

300 mL. 



INTRAVENOUS FLUIDS 

Crystalloids.   



DRAINS

16-Monegasque Jennings catheter.  



COMPLICATIONS 

None.  



CONDITION 

Patient was taken to recovery room in stable condition.  



STATEMENT OF MEDICAL NECESSITY  

Patient is a 38-year-old male who was originally seen in the emergency room on 

the evening of the 19th with complaint of left scrotal swelling.  His CT scan 

revealed what appeared to be a loculated pyocele around the left testis.  Of 

note, the patient had undergone a right incision and drainage of a scrotal 

abscess two days prior by Dr. Jack Ullrich of General Surgery, and was started 

on Augmentin.  At that time, a scrotal incision and drainage of the left 

hemiscrotum was performed in the emergency room.  A small amount of purulent 

material was drained.  The loculations were broken down with a cotton tip 

applicator, and the wound was irrigated.  The right side was also reexplored and

a small amount of purulent material removed from this as well.  At that point, 

the patient had undiagnosed diabetes and had a random blood glucose of 577.  At 

that time, his white count was 9 with a left shift of 85%.  



The patient was admitted to the ICU, where he was under the care of the 

hospitalist for his uncontrolled diabetes and elevated lipase, which was 515.  

Yesterday, the patient was evaluated and had had significant improvement in his 

exam, with only a small amount of discharge from each scrotal incision by 

exploration.  He had remained afebrile, with a white count that was slightly 

decreased to 6.8, down from 6.9.  His blood sugars were in the 300 range.  

However, this morning, the patient experienced a low-grade temperature of 101.2,

and his white count increased to 11.7.  Physical exam revealed increased 

swelling in both hemiscrotums as well as new induration and swelling in the 

right perineum down to the buttock area.  Therefore, he is now being brought to 

the operating room for planned scrotal exploration and debridement if indicated.

 



DESCRIPTION OF PROCEDURE 

Patient was brought to the operating room.  After general anesthetic was 

obtained, he was placed in the dorsal lithotomy position and prepped and draped 

in the usual sterile manner.  Anesthetic cystoscopy was performed with the 18-

Monegasque rigid scope and both 30- and 70-degree lenses.  He had a normal-appearing

pendulous and bulbar urethra.  His membranous urethra was also normal.  His 

prostatic urethra revealed a wide-open fossa.  His bladder was 1+ trabeculated. 

There were no bladder lesions or other abnormalities.  He had slit-like ureteral

orifices, both effluxing clear urine.  Pullout cystoscopic exam was also normal.

 



At this point, a 16-Monegasque Jennings catheter was placed sterilely on the field.  A 

left scrotal incision was made from the prior incision and drainage down 

inferiorly.  There was a moderate amount of nonviable tissue around the testis 

and cord structures, but this did not appear to be invading beyond the tunica 

vaginalis.  The testis itself and epididymis palpated completely normal.  I was 

able to track my finger down more posteriorly and then across the midline to the

patient's right side.  Therefore, the incision on the right side was extended 

down inferiorly as well.  Again, there was a moderate amount of nonviable tissue

around the testis but not involving the testis itself, and it appeared to 

proceed up the cord.  This infection appeared to going down to the right 

perineal/buttock area.  



At this point, I consulted Dr. Steve Vivar of General Surgery for evaluation 

of possible rectal involvement.  He scrubbed in and explored this area down to 

the right perineum, and there did not appear to be any involvement in the 

rectum.  He debrided the right side and followed the infection up to the right 

inguinal canal.  Debridement was also carried out around the cord structures, 

but again the testis on this side also appeared completely normal to palpation, 

and infection did not appear to involve the cord itself.  



At this point, I then proceeded to debride the patient's left side, and it 

involved most of the scrotum, and he ended up having a partial scrotectomy of 

approximately 80%.  The infection also proceeded up the left inguinal canal, 

which I was able to partially debride, but the question was whether he would 

need a full scrotal and/or inguinal exploration.  We debrided greater than 90% 

of the nonviable tissues.  The whole wound bed was irrigated.  Bleeding vessels 

were controlled with electrocautery, and the wound was packed with Betadine-

soaked Kerlix gauze.  A fluff dressing was placed on this, and then mesh panty 

support was placed to keep the dressing in place.  



The patient was then placed supine, and Dr. Vivar placed a central line.  The 

patient was next awakened and taken to the recovery room in stable condition.  

KRISTI